# Patient Record
Sex: FEMALE | Race: WHITE | NOT HISPANIC OR LATINO | Employment: FULL TIME | URBAN - METROPOLITAN AREA
[De-identification: names, ages, dates, MRNs, and addresses within clinical notes are randomized per-mention and may not be internally consistent; named-entity substitution may affect disease eponyms.]

---

## 2017-08-31 ENCOUNTER — APPOINTMENT (OUTPATIENT)
Dept: MAMMOGRAPHY | Facility: CLINIC | Age: 52
End: 2017-08-31

## 2017-08-31 ENCOUNTER — OFFICE VISIT (OUTPATIENT)
Dept: OBSTETRICS AND GYNECOLOGY | Facility: CLINIC | Age: 52
End: 2017-08-31

## 2017-08-31 VITALS
SYSTOLIC BLOOD PRESSURE: 118 MMHG | WEIGHT: 249 LBS | DIASTOLIC BLOOD PRESSURE: 60 MMHG | HEIGHT: 66 IN | BODY MASS INDEX: 40.02 KG/M2

## 2017-08-31 DIAGNOSIS — Z12.31 VISIT FOR SCREENING MAMMOGRAM: ICD-10-CM

## 2017-08-31 DIAGNOSIS — Z01.419 WELL WOMAN EXAM WITH ROUTINE GYNECOLOGICAL EXAM: Primary | ICD-10-CM

## 2017-08-31 PROCEDURE — 99214 OFFICE O/P EST MOD 30 MIN: CPT | Performed by: OBSTETRICS & GYNECOLOGY

## 2017-08-31 PROCEDURE — 77067 SCR MAMMO BI INCL CAD: CPT | Performed by: OBSTETRICS & GYNECOLOGY

## 2017-08-31 RX ORDER — LOSARTAN POTASSIUM 50 MG/1
TABLET ORAL
Refills: 0 | COMMUNITY
Start: 2017-06-02 | End: 2019-12-27

## 2017-08-31 RX ORDER — METAXALONE 800 MG/1
TABLET ORAL
Refills: 1 | COMMUNITY
Start: 2017-07-15 | End: 2021-10-13

## 2017-08-31 RX ORDER — ETODOLAC 500 MG/1
TABLET, FILM COATED ORAL
Refills: 1 | COMMUNITY
Start: 2017-06-12 | End: 2019-12-27

## 2018-07-25 ENCOUNTER — OFFICE VISIT (OUTPATIENT)
Dept: ENDOCRINOLOGY | Age: 53
End: 2018-07-25

## 2018-07-25 VITALS
BODY MASS INDEX: 40.98 KG/M2 | WEIGHT: 255 LBS | DIASTOLIC BLOOD PRESSURE: 76 MMHG | SYSTOLIC BLOOD PRESSURE: 122 MMHG | RESPIRATION RATE: 16 BRPM | HEIGHT: 66 IN

## 2018-07-25 DIAGNOSIS — N95.1 MENOPAUSAL SYMPTOMS: ICD-10-CM

## 2018-07-25 DIAGNOSIS — R94.6 ABNORMAL THYROID FUNCTION TEST: Primary | ICD-10-CM

## 2018-07-25 DIAGNOSIS — E66.01 MORBID OBESITY (HCC): ICD-10-CM

## 2018-07-25 DIAGNOSIS — E55.9 VITAMIN D DEFICIENCY: ICD-10-CM

## 2018-07-25 DIAGNOSIS — K59.00 CONSTIPATION, UNSPECIFIED CONSTIPATION TYPE: ICD-10-CM

## 2018-07-25 DIAGNOSIS — R53.82 CHRONIC FATIGUE: ICD-10-CM

## 2018-07-25 DIAGNOSIS — E04.2 NONTOXIC MULTINODULAR GOITER: ICD-10-CM

## 2018-07-25 PROCEDURE — 99214 OFFICE O/P EST MOD 30 MIN: CPT | Performed by: INTERNAL MEDICINE

## 2018-07-25 RX ORDER — HYDROCODONE BITARTRATE AND ACETAMINOPHEN 7.5; 325 MG/1; MG/1
TABLET ORAL
Refills: 0 | COMMUNITY
Start: 2018-07-09 | End: 2019-12-27

## 2018-07-25 RX ORDER — GABAPENTIN 300 MG/1
300 CAPSULE ORAL NIGHTLY
COMMUNITY
Start: 2018-07-09 | End: 2019-12-27

## 2018-07-25 RX ORDER — PHENTERMINE HYDROCHLORIDE 30 MG/1
30 CAPSULE ORAL EVERY MORNING
Qty: 30 CAPSULE | Refills: 5 | Status: SHIPPED | OUTPATIENT
Start: 2018-07-25 | End: 2019-02-01

## 2018-07-25 RX ORDER — CHOLECALCIFEROL (VITAMIN D3) 1250 MCG
CAPSULE ORAL
Refills: 1 | COMMUNITY
Start: 2018-06-05 | End: 2023-03-02

## 2018-07-25 NOTE — PROGRESS NOTES
"Kori Crow is a 52 y.o. female seen as a new patient for low TSH. She states that she had a thyroid biopsy 6 years ago and results were normal. No recent thyroid US. She is having fatigue, trouble sleeping, headaches, and constipation.     History of Present Illness this is a 52-year-old female who has been referred for further evaluation and treatment of a low TSH.  She said she is suffering from sarcoidosis and because of nodules in her mind she underwent having a CAT scan of her chest which identified nodules in her thyroid.  Subsequently she was evaluated for that at the [facility] and had a thyroid biopsy with benign results.  She is a status post hysterectomy and has a history of ovarian cyst.  For the past 3 years she feels having hot flashes occasionally however since her divorce 16 years ago she has been celibate and not active sexually.  She also has focal seizures for which she is taking Topamax 200 mg twice daily but despite that she has gained weight and has a very difficult time losing it.  Her family history is positive for her grandmother having had diabetes and her mother had some thyroid issues and also ended up having parathyroidectomy.     /76   Resp 16   Ht 167.6 cm (66\")   Wt 116 kg (255 lb)   BMI 41.16 kg/m²      No Known Allergies    Current Outpatient Prescriptions:   •  Cholecalciferol (VITAMIN D3) 30001 units capsule, TK 1 C PO WEEKLY, Disp: , Rfl: 1  •  etodolac (LODINE) 500 MG tablet, TK 1 T PO BID WC, Disp: , Rfl: 1  •  gabapentin (NEURONTIN) 300 MG capsule, , Disp: , Rfl:   •  HYDROcodone-acetaminophen (NORCO) 7.5-325 MG per tablet, TK 1 T PO QD TO BID PRN P, Disp: , Rfl: 0  •  losartan (COZAAR) 50 MG tablet, , Disp: , Rfl: 0  •  metaxalone (SKELAXIN) 800 MG tablet, TK 1 T PO TID PRN, Disp: , Rfl: 1  •  topiramate (TOPAMAX) 200 MG tablet, TK 1 T PO  BID, Disp: , Rfl: 1      The following portions of the patient's history were reviewed and " updated as appropriate: allergies, current medications, past family history, past medical history, past social history, past surgical history and problem list.    Review of Systems   Constitutional: Positive for fatigue.   Eyes: Negative.    Respiratory: Negative.    Cardiovascular: Negative.    Gastrointestinal: Positive for constipation.   Endocrine: Negative.    Genitourinary: Negative.    Musculoskeletal: Negative.    Skin: Negative.    Allergic/Immunologic: Negative.    Neurological: Positive for headaches.   Hematological: Negative.    Psychiatric/Behavioral: Positive for sleep disturbance.       Objective   Physical Exam   Constitutional: She is oriented to person, place, and time. She appears well-developed and well-nourished. No distress.   Morbidly obese   HENT:   Head: Normocephalic and atraumatic.   Right Ear: External ear normal.   Left Ear: External ear normal.   Nose: Nose normal.   Mouth/Throat: Oropharynx is clear and moist. No oropharyngeal exudate.   Eyes: Pupils are equal, round, and reactive to light. Conjunctivae and EOM are normal. Right eye exhibits no discharge. Left eye exhibits no discharge. No scleral icterus.   Neck: Normal range of motion. Neck supple. No JVD present. No tracheal deviation present. Thyromegaly present.   Significant enlargement of both lobes of her thyroid with 2 big nodules 1 on each lobe of her thyroid.  The entire goiter moves freely with swallowing and its nontender.  I was unable to feel any lymphadenopathies in the anterior or posterior cervical or supraclavicular area.   Cardiovascular: Normal rate, regular rhythm, normal heart sounds and intact distal pulses.  Exam reveals no gallop and no friction rub.    No murmur heard.  Pulmonary/Chest: Effort normal and breath sounds normal. No stridor. No respiratory distress. She has no wheezes. She has no rales. She exhibits no tenderness.   Abdominal: Soft. Bowel sounds are normal. She exhibits no distension and no  mass. There is no tenderness. There is no rebound and no guarding. No hernia.   Musculoskeletal: Normal range of motion. She exhibits no edema, tenderness or deformity.   Lymphadenopathy:     She has no cervical adenopathy.   Neurological: She is alert and oriented to person, place, and time. She has normal reflexes. She displays normal reflexes. No cranial nerve deficit or sensory deficit. She exhibits normal muscle tone. Coordination normal.   Skin: Skin is warm and dry. No rash noted. She is not diaphoretic. No erythema. No pallor.   Psychiatric: She has a normal mood and affect. Her behavior is normal. Judgment and thought content normal.   Nursing note and vitals reviewed.        Assessment/Plan   Diagnoses and all orders for this visit:    Abnormal thyroid function test  -     T3, Free  -     T4 & TSH ([facility])  -     T4, Free  -     Thyroglobulin With Anti-TG  -     Uric Acid  -     Vitamin D 25 Hydroxy  -     Comprehensive Metabolic Panel  -     C-Peptide  -     Follicle Stimulating Hormone  -     Hemoglobin A1c  -     Lipid Panel  -     Luteinizing Hormone  -     MicroAlbumin, Urine, Random - Urine, Clean Catch  -     Prolactin  -     Cortisol  -     ACTH  -     PTH, Intact  -     Phosphorus  -     Calcium, Ionized  -     T3, Free; Future  -     T4 & TSH ([facility]); Future  -     T4, Free; Future  -     Comprehensive Thyroglobulin; Future  -     Comprehensive Metabolic Panel; Future  -     C-Peptide; Future  -     Hemoglobin A1c; Future  -     Lipid Panel; Future  -     Vitamin D 25 Hydroxy; Future  -     Uric Acid; Future    Morbid obesity (CMS/HCC)  -     T3, Free  -     T4 & TSH ([facility])  -     T4, Free  -     Thyroglobulin With Anti-TG  -     Uric Acid  -     Vitamin D 25 Hydroxy  -     Comprehensive Metabolic Panel  -     C-Peptide  -     Follicle Stimulating Hormone  -     Hemoglobin A1c  -     Lipid Panel  -     Luteinizing Hormone  -     MicroAlbumin, Urine, Random - Urine, Clean Catch  -      Prolactin  -     Cortisol  -     ACTH  -     PTH, Intact  -     Phosphorus  -     Calcium, Ionized  -     T3, Free; Future  -     T4 & TSH ([facility]); Future  -     T4, Free; Future  -     Comprehensive Thyroglobulin; Future  -     Comprehensive Metabolic Panel; Future  -     C-Peptide; Future  -     Hemoglobin A1c; Future  -     Lipid Panel; Future  -     Vitamin D 25 Hydroxy; Future  -     Uric Acid; Future    Chronic fatigue  -     T3, Free  -     T4 & TSH ([facility])  -     T4, Free  -     Thyroglobulin With Anti-TG  -     Uric Acid  -     Vitamin D 25 Hydroxy  -     Comprehensive Metabolic Panel  -     C-Peptide  -     Follicle Stimulating Hormone  -     Hemoglobin A1c  -     Lipid Panel  -     Luteinizing Hormone  -     MicroAlbumin, Urine, Random - Urine, Clean Catch  -     Prolactin  -     Cortisol  -     ACTH  -     PTH, Intact  -     Phosphorus  -     Calcium, Ionized  -     T3, Free; Future  -     T4 & TSH ([facility]); Future  -     T4, Free; Future  -     Comprehensive Thyroglobulin; Future  -     Comprehensive Metabolic Panel; Future  -     C-Peptide; Future  -     Hemoglobin A1c; Future  -     Lipid Panel; Future  -     Vitamin D 25 Hydroxy; Future  -     Uric Acid; Future    Vitamin D deficiency  -     T3, Free  -     T4 & TSH ([facility])  -     T4, Free  -     Thyroglobulin With Anti-TG  -     Uric Acid  -     Vitamin D 25 Hydroxy  -     Comprehensive Metabolic Panel  -     C-Peptide  -     Follicle Stimulating Hormone  -     Hemoglobin A1c  -     Lipid Panel  -     Luteinizing Hormone  -     MicroAlbumin, Urine, Random - Urine, Clean Catch  -     Prolactin  -     Cortisol  -     ACTH  -     PTH, Intact  -     Phosphorus  -     Calcium, Ionized  -     T3, Free; Future  -     T4 & TSH ([facility]); Future  -     T4, Free; Future  -     Comprehensive Thyroglobulin; Future  -     Comprehensive Metabolic Panel; Future  -     C-Peptide; Future  -     Hemoglobin A1c; Future  -     Lipid Panel; Future  -     Vitamin  D 25 Hydroxy; Future  -     Uric Acid; Future    Menopausal symptoms  -     T3, Free  -     T4 & TSH ([facility])  -     T4, Free  -     Thyroglobulin With Anti-TG  -     Uric Acid  -     Vitamin D 25 Hydroxy  -     Comprehensive Metabolic Panel  -     C-Peptide  -     Follicle Stimulating Hormone  -     Hemoglobin A1c  -     Lipid Panel  -     Luteinizing Hormone  -     MicroAlbumin, Urine, Random - Urine, Clean Catch  -     Prolactin  -     Cortisol  -     ACTH  -     PTH, Intact  -     Phosphorus  -     Calcium, Ionized  -     T3, Free; Future  -     T4 & TSH ([facility]); Future  -     T4, Free; Future  -     Comprehensive Thyroglobulin; Future  -     Comprehensive Metabolic Panel; Future  -     C-Peptide; Future  -     Hemoglobin A1c; Future  -     Lipid Panel; Future  -     Vitamin D 25 Hydroxy; Future  -     Uric Acid; Future    Constipation, unspecified constipation type  -     T3, Free  -     T4 & TSH ([facility])  -     T4, Free  -     Thyroglobulin With Anti-TG  -     Uric Acid  -     Vitamin D 25 Hydroxy  -     Comprehensive Metabolic Panel  -     C-Peptide  -     Follicle Stimulating Hormone  -     Hemoglobin A1c  -     Lipid Panel  -     Luteinizing Hormone  -     MicroAlbumin, Urine, Random - Urine, Clean Catch  -     Prolactin  -     Cortisol  -     ACTH  -     PTH, Intact  -     Phosphorus  -     Calcium, Ionized  -     T3, Free; Future  -     T4 & TSH ([facility]); Future  -     T4, Free; Future  -     Comprehensive Thyroglobulin; Future  -     Comprehensive Metabolic Panel; Future  -     C-Peptide; Future  -     Hemoglobin A1c; Future  -     Lipid Panel; Future  -     Vitamin D 25 Hydroxy; Future  -     Uric Acid; Future    Nontoxic multinodular goiter  -     US Thyroid; Future  -     T3, Free; Future  -     T4 & TSH ([facility]); Future  -     T4, Free; Future  -     Comprehensive Thyroglobulin; Future  -     Comprehensive Metabolic Panel; Future  -     C-Peptide; Future  -     Hemoglobin A1c; Future  -      Lipid Panel; Future  -     Vitamin D 25 Hydroxy; Future  -     Uric Acid; Future    Other orders  -     SCANNED - LABS  -     phentermine 30 MG capsule; Take 1 capsule by mouth Every Morning.               In summary I saw and examined this 52-year-old female for above-mentioned problems.  I reviewed her laboratory evaluations as well as office notes from her primary care provider and at this point we will go ahead and order an extensive laboratory evaluation as well as an ultrasound of her thyroid and once the results come back we will go ahead and call for any possible modification or new medications.  Also she is already on Topamax and combination of Topamax and phentermine have caused weight loss and therefore am going to go ahead and start her on phentermine 30 mg every morning.  She will see Ms. Yulia Brewer in 6 months or sooner if needed with laboratory evaluation prior to each office visit.

## 2018-07-26 LAB
25(OH)D3+25(OH)D2 SERPL-MCNC: 44.2 NG/ML (ref 30–100)
ACTH PLAS-MCNC: 11.7 PG/ML (ref 7.2–63.3)
ALBUMIN SERPL-MCNC: 4.8 G/DL (ref 3.5–5.2)
ALBUMIN/GLOB SERPL: 2.5 G/DL
ALP SERPL-CCNC: 65 U/L (ref 39–117)
ALT SERPL-CCNC: 15 U/L (ref 1–33)
AST SERPL-CCNC: 12 U/L (ref 1–32)
BILIRUB SERPL-MCNC: 0.4 MG/DL (ref 0.1–1.2)
BUN SERPL-MCNC: 20 MG/DL (ref 6–20)
BUN/CREAT SERPL: 25.3 (ref 7–25)
C PEPTIDE SERPL-MCNC: 4.5 NG/ML (ref 1.1–4.4)
CA-I SERPL ISE-MCNC: 5.6 MG/DL (ref 4.5–5.6)
CALCIUM SERPL-MCNC: 10 MG/DL (ref 8.6–10.5)
CHLORIDE SERPL-SCNC: 106 MMOL/L (ref 98–107)
CHOLEST SERPL-MCNC: 218 MG/DL (ref 0–200)
CO2 SERPL-SCNC: 22.7 MMOL/L (ref 22–29)
CORTIS SERPL-MCNC: 2.9 UG/DL
CREAT SERPL-MCNC: 0.79 MG/DL (ref 0.57–1)
FSH SERPL-ACNC: 53.1 MIU/ML
GLOBULIN SER CALC-MCNC: 1.9 GM/DL
GLUCOSE SERPL-MCNC: 102 MG/DL (ref 65–99)
HBA1C MFR BLD: 5.55 % (ref 4.8–5.6)
HDLC SERPL-MCNC: 61 MG/DL (ref 40–60)
INTERPRETATION: NORMAL
LDLC SERPL CALC-MCNC: 125 MG/DL (ref 0–100)
LH SERPL-ACNC: 37.2 MIU/ML
Lab: NORMAL
MICROALBUMIN UR-MCNC: 14.9 UG/ML
PHOSPHATE SERPL-MCNC: 3.5 MG/DL (ref 2.5–4.5)
POTASSIUM SERPL-SCNC: 4.1 MMOL/L (ref 3.5–5.2)
PROLACTIN SERPL-MCNC: 17.9 NG/ML (ref 4.8–23.3)
PROT SERPL-MCNC: 6.7 G/DL (ref 6–8.5)
PTH-INTACT SERPL-MCNC: 40 PG/ML (ref 15–65)
SODIUM SERPL-SCNC: 144 MMOL/L (ref 136–145)
T3FREE SERPL-MCNC: 2.6 PG/ML (ref 2–4.4)
T4 FREE SERPL-MCNC: 1.15 NG/DL (ref 0.93–1.7)
T4 SERPL-MCNC: 6.41 MCG/DL (ref 4.5–11.7)
THYROGLOB AB SERPL-ACNC: <1 IU/ML (ref 0–0.9)
THYROGLOB SERPL-MCNC: 26.7 NG/ML (ref 1.5–38.5)
TRIGL SERPL-MCNC: 161 MG/DL (ref 0–150)
TSH SERPL DL<=0.005 MIU/L-ACNC: 0.36 MIU/ML (ref 0.27–4.2)
URATE SERPL-MCNC: 4.5 MG/DL (ref 2.4–5.7)
VLDLC SERPL CALC-MCNC: 32.2 MG/DL (ref 5–40)

## 2018-08-01 ENCOUNTER — HOSPITAL ENCOUNTER (OUTPATIENT)
Dept: ULTRASOUND IMAGING | Facility: HOSPITAL | Age: 53
Discharge: HOME | End: 2018-08-01
Attending: INTERNAL MEDICINE | Admitting: INTERNAL MEDICINE

## 2018-08-01 DIAGNOSIS — E04.2 NONTOXIC MULTINODULAR GOITER: ICD-10-CM

## 2018-08-01 PROCEDURE — 76536 US EXAM OF HEAD AND NECK: CPT

## 2018-10-01 DIAGNOSIS — Z12.31 VISIT FOR SCREENING MAMMOGRAM: Primary | ICD-10-CM

## 2018-11-02 ENCOUNTER — HOSPITAL ENCOUNTER (OUTPATIENT)
Dept: MAMMOGRAPHY | Facility: HOSPITAL | Age: 53
Discharge: HOME | End: 2018-11-02
Attending: OBSTETRICS & GYNECOLOGY | Admitting: OBSTETRICS & GYNECOLOGY

## 2018-11-02 DIAGNOSIS — Z12.31 VISIT FOR SCREENING MAMMOGRAM: ICD-10-CM

## 2018-11-02 PROCEDURE — 77067 SCR MAMMO BI INCL CAD: CPT

## 2019-01-10 DIAGNOSIS — E04.2 NONTOXIC MULTINODULAR GOITER: ICD-10-CM

## 2019-01-10 DIAGNOSIS — E55.9 VITAMIN D DEFICIENCY: Primary | ICD-10-CM

## 2019-01-18 ENCOUNTER — LAB (OUTPATIENT)
Dept: ENDOCRINOLOGY | Age: 54
End: 2019-01-18

## 2019-01-18 DIAGNOSIS — E55.9 VITAMIN D DEFICIENCY: ICD-10-CM

## 2019-01-18 DIAGNOSIS — E04.2 NONTOXIC MULTINODULAR GOITER: ICD-10-CM

## 2019-01-20 LAB
25(OH)D3+25(OH)D2 SERPL-MCNC: 41.7 NG/ML (ref 30–100)
ALBUMIN SERPL-MCNC: 4.6 G/DL (ref 3.5–5.2)
ALBUMIN/GLOB SERPL: 2 G/DL
ALP SERPL-CCNC: 63 U/L (ref 39–117)
ALT SERPL-CCNC: 13 U/L (ref 1–33)
AST SERPL-CCNC: 13 U/L (ref 1–32)
BILIRUB SERPL-MCNC: 0.5 MG/DL (ref 0.1–1.2)
BUN SERPL-MCNC: 20 MG/DL (ref 6–20)
BUN/CREAT SERPL: 27 (ref 7–25)
CALCIUM SERPL-MCNC: 10.4 MG/DL (ref 8.6–10.5)
CHLORIDE SERPL-SCNC: 108 MMOL/L (ref 98–107)
CHOLEST SERPL-MCNC: 220 MG/DL (ref 0–200)
CO2 SERPL-SCNC: 23.5 MMOL/L (ref 22–29)
CREAT SERPL-MCNC: 0.74 MG/DL (ref 0.57–1)
FT4I SERPL CALC-MCNC: 2 (ref 1.2–4.9)
GLOBULIN SER CALC-MCNC: 2.3 GM/DL
GLUCOSE SERPL-MCNC: 98 MG/DL (ref 65–99)
HDLC SERPL-MCNC: 53 MG/DL (ref 40–60)
INTERPRETATION: NORMAL
LDLC SERPL CALC-MCNC: 131 MG/DL (ref 0–100)
POTASSIUM SERPL-SCNC: 4.4 MMOL/L (ref 3.5–5.2)
PROT SERPL-MCNC: 6.9 G/DL (ref 6–8.5)
SODIUM SERPL-SCNC: 144 MMOL/L (ref 136–145)
T3FREE SERPL-MCNC: 3.2 PG/ML (ref 2–4.4)
T3RU NFR SERPL: 25 % (ref 24–39)
T4 FREE SERPL-MCNC: 1.32 NG/DL (ref 0.93–1.7)
T4 SERPL-MCNC: 7.9 UG/DL (ref 4.5–12)
THYROGLOB AB SERPL-ACNC: <1 IU/ML
THYROGLOB SERPL-MCNC: 36.2 NG/ML
THYROGLOB SERPL-MCNC: NORMAL NG/ML
TRIGL SERPL-MCNC: 179 MG/DL (ref 0–150)
TSH SERPL DL<=0.005 MIU/L-ACNC: 0.61 UIU/ML (ref 0.45–4.5)
VLDLC SERPL CALC-MCNC: 35.8 MG/DL (ref 5–40)

## 2019-01-21 ENCOUNTER — RESULTS ENCOUNTER (OUTPATIENT)
Dept: ENDOCRINOLOGY | Age: 54
End: 2019-01-21

## 2019-01-21 DIAGNOSIS — K59.00 CONSTIPATION, UNSPECIFIED CONSTIPATION TYPE: ICD-10-CM

## 2019-01-21 DIAGNOSIS — E55.9 VITAMIN D DEFICIENCY: ICD-10-CM

## 2019-01-21 DIAGNOSIS — R53.82 CHRONIC FATIGUE: ICD-10-CM

## 2019-01-21 DIAGNOSIS — E66.01 MORBID OBESITY (HCC): ICD-10-CM

## 2019-01-21 DIAGNOSIS — N95.1 MENOPAUSAL SYMPTOMS: ICD-10-CM

## 2019-01-21 DIAGNOSIS — E04.2 NONTOXIC MULTINODULAR GOITER: ICD-10-CM

## 2019-01-21 DIAGNOSIS — R94.6 ABNORMAL THYROID FUNCTION TEST: ICD-10-CM

## 2019-02-01 ENCOUNTER — OFFICE VISIT (OUTPATIENT)
Dept: ENDOCRINOLOGY | Age: 54
End: 2019-02-01

## 2019-02-01 VITALS
HEIGHT: 66 IN | WEIGHT: 225 LBS | BODY MASS INDEX: 36.16 KG/M2 | SYSTOLIC BLOOD PRESSURE: 124 MMHG | DIASTOLIC BLOOD PRESSURE: 78 MMHG

## 2019-02-01 DIAGNOSIS — R53.82 CHRONIC FATIGUE: ICD-10-CM

## 2019-02-01 DIAGNOSIS — E04.2 NONTOXIC MULTINODULAR GOITER: Primary | ICD-10-CM

## 2019-02-01 DIAGNOSIS — N95.1 MENOPAUSAL SYMPTOMS: ICD-10-CM

## 2019-02-01 DIAGNOSIS — E78.2 MIXED HYPERLIPIDEMIA: ICD-10-CM

## 2019-02-01 DIAGNOSIS — E55.9 VITAMIN D DEFICIENCY DISEASE: ICD-10-CM

## 2019-02-01 DIAGNOSIS — R94.6 ABNORMAL THYROID FUNCTION TEST: ICD-10-CM

## 2019-02-01 DIAGNOSIS — E55.9 VITAMIN D DEFICIENCY: ICD-10-CM

## 2019-02-01 DIAGNOSIS — E66.01 MORBID OBESITY (HCC): ICD-10-CM

## 2019-02-01 PROCEDURE — 99214 OFFICE O/P EST MOD 30 MIN: CPT | Performed by: NURSE PRACTITIONER

## 2019-02-01 RX ORDER — ROSUVASTATIN CALCIUM 10 MG/1
10 TABLET, COATED ORAL NIGHTLY
Qty: 30 TABLET | Refills: 5 | Status: SHIPPED | OUTPATIENT
Start: 2019-02-01 | End: 2019-08-09 | Stop reason: SDUPTHER

## 2019-02-01 RX ORDER — PHENTERMINE HYDROCHLORIDE 37.5 MG/1
37.5 CAPSULE ORAL EVERY MORNING
Qty: 30 CAPSULE | Refills: 2 | Status: SHIPPED | OUTPATIENT
Start: 2019-02-01 | End: 2019-06-17 | Stop reason: SDUPTHER

## 2019-02-01 NOTE — PROGRESS NOTES
"Subjective   Arely Crow is a 53 y.o. female is here today for follow-up.  Chief Complaint   Patient presents with   • Goiter     recent labs   • abnormal thyroid function   • Vitamin D Deficiency     /78   Ht 167.6 cm (66\")   Wt 102 kg (225 lb)   BMI 36.32 kg/m²   Current Outpatient Medications on File Prior to Visit   Medication Sig   • Cholecalciferol (VITAMIN D3) 41941 units capsule TK 1 C PO WEEKLY   • etodolac (LODINE) 500 MG tablet TK 1 T PO BID WC   • gabapentin (NEURONTIN) 300 MG capsule Take 300 mg by mouth Every Night.   • HYDROcodone-acetaminophen (NORCO) 7.5-325 MG per tablet TK 1 T PO QD TO BID PRN P   • losartan (COZAAR) 50 MG tablet    • phentermine 30 MG capsule Take 1 capsule by mouth Every Morning.   • topiramate (TOPAMAX) 200 MG tablet TK 1 T PO  BID   • metaxalone (SKELAXIN) 800 MG tablet TK 1 T PO TID PRN     No current facility-administered medications on file prior to visit.      Family History   Problem Relation Age of Onset   • Ovarian cancer Maternal Grandmother      Social History     Tobacco Use   • Smoking status: Never Smoker   Substance Use Topics   • Alcohol use: Not on file   • Drug use: Not on file     No Known Allergies      History of Present Illness  Encounter Diagnoses   Name Primary?   • Morbid obesity (CMS/HCC) Yes   • Vitamin D deficiency    • Nontoxic multinodular goiter    • Abnormal thyroid function test    • Chronic fatigue    • Menopausal symptoms    53-year-old female patient here today for routine follow-up visit.  She is being seen for the above-mentioned problems.  She had recent labs which were reviewed and she was provided a copy.  She does not know of any family history of cardiovascular disease or stroke.  She states her mother does not have any history but she is not familiar with her father's history.  She has lost 30 pounds since starting phentermine at her last visit with Dr. Celeste.  She is also on topiramate which she states this helped with " weight loss.  We discussed the benefits of statin therapy.  She has plateaued with phentermine so we discussed increasing her dose.  BRO Nichols was reviewed at today's visit.  Her blood pressure is an satisfactory range    The following portions of the patient's history were reviewed and updated as appropriate: allergies, current medications, past family history, past medical history, past social history, past surgical history and problem list.    Review of Systems   Constitutional: Negative for fatigue.   HENT: Negative for trouble swallowing.    Eyes: Negative for visual disturbance.   Cardiovascular: Negative for leg swelling.   Endocrine: Negative for polyuria.   Skin: Negative for wound.   Neurological: Negative for numbness.       Objective   Physical Exam   Constitutional: She is oriented to person, place, and time. She appears well-developed and well-nourished. No distress.   HENT:   Head: Normocephalic and atraumatic.   Right Ear: External ear normal.   Left Ear: External ear normal.   Nose: Nose normal.   Mouth/Throat: Oropharynx is clear and moist. No oropharyngeal exudate.   Eyes: EOM are normal. Pupils are equal, round, and reactive to light. Right eye exhibits no discharge. Left eye exhibits no discharge.   Neck: Trachea normal, normal range of motion and full passive range of motion without pain. Neck supple. No tracheal tenderness present. Carotid bruit is not present. No tracheal deviation, no edema and no erythema present. Thyromegaly present. No thyroid mass present.   Cardiovascular: Normal rate, regular rhythm, normal heart sounds and intact distal pulses. Exam reveals no gallop and no friction rub.   No murmur heard.  Pulmonary/Chest: Effort normal and breath sounds normal. No stridor. No respiratory distress. She has no wheezes. She has no rales.   Abdominal: Soft. Bowel sounds are normal. She exhibits no distension.   Musculoskeletal: Normal range of motion. She exhibits no edema or deformity.    Lymphadenopathy:     She has no cervical adenopathy.   Neurological: She is alert and oriented to person, place, and time.   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 No rash noted. She is not diaphoretic. No erythema. No pallor.   Psychiatric: She has a normal mood and affect. Her behavior is normal. Judgment and thought content normal.   Nursing note and vitals reviewed.    Results for orders placed or performed in visit on 01/18/19   Thyroid Panel With TSH   Result Value Ref Range    TSH 0.609 0.450 - 4.500 uIU/mL    T4, Total 7.9 4.5 - 12.0 ug/dL    T3 Uptake 25 24 - 39 %    Free Thyroxine Index 2.0 1.2 - 4.9   Comprehensive Thyroglobulin   Result Value Ref Range    Thyroglobulin Ab <1.0 IU/mL    Thyroglobulin 36.2 ng/mL    Thyroglobulin (TG-RUSLAN) Comment ng/mL   T4, Free   Result Value Ref Range    Free T4 1.32 0.93 - 1.70 ng/dL   T3, Free   Result Value Ref Range    T3, Free 3.2 2.0 - 4.4 pg/mL   Vitamin D 25 Hydroxy   Result Value Ref Range    25 Hydroxy, Vitamin D 41.7 30.0 - 100.0 ng/ml   Lipid Panel   Result Value Ref Range    Total Cholesterol 220 (H) 0 - 200 mg/dL    Triglycerides 179 (H) 0 - 150 mg/dL    HDL Cholesterol 53 40 - 60 mg/dL    VLDL Cholesterol 35.8 5 - 40 mg/dL    LDL Cholesterol  131 (H) 0 - 100 mg/dL   Comprehensive Metabolic Panel   Result Value Ref Range    Glucose 98 65 - 99 mg/dL    BUN 20 6 - 20 mg/dL    Creatinine 0.74 0.57 - 1.00 mg/dL    eGFR Non African Am 82 >60 mL/min/1.73    eGFR African Am 99 >60 mL/min/1.73    BUN/Creatinine Ratio 27.0 (H) 7.0 - 25.0    Sodium 144 136 - 145 mmol/L    Potassium 4.4 3.5 - 5.2 mmol/L    Chloride 108 (H) 98 - 107 mmol/L    Total CO2 23.5 22.0 - 29.0 mmol/L    Calcium 10.4 8.6 - 10.5 mg/dL    Total Protein 6.9 6.0 - 8.5 g/dL    Albumin 4.60 3.50 - 5.20 g/dL    Globulin 2.3 gm/dL    A/G Ratio 2.0 g/dL    Total Bilirubin 0.5 0.1 - 1.2 mg/dL    Alkaline Phosphatase 63 39 - 117 U/L    AST (SGOT) 13 1 - 32 U/L    ALT (SGPT) 13 1 - 33  U/L   Cardiovascular Risk Assessment   Result Value Ref Range    Interpretation Note          Assessment/Plan   Problems Addressed this Visit        Digestive    Vitamin D deficiency    Morbid obesity (CMS/HCC) - Primary       Endocrine    Nontoxic multinodular goiter       Genitourinary    Menopausal symptoms       Other    Chronic fatigue    Abnormal thyroid function test          In summary, patient was seen and examined.  Metabolically she is stable.  She has normal thyroid hormones.  She is currently not on any thyroid medication.  Her last thyroid ultrasound was in July of last year and shows that she does have a multinodular goiter.  She has had a history of a biopsy that was benign.  She denies any problems with dysphasia were tenderness with palpation.  She has familial tremors in her right hand which she states is not new.  She has been start Crestor 10 mg once daily for treatment of LDL cholesterol.  Her phentermine has been increased to 37.5 mg daily.  BRO Nichols was reviewed.  She is to follow-up in 6 months with Dr. Celeste or myself with labs prior.  She's been advised she can contact the office should she have any concerns prior to then.  She has been provided information on diet to lower cholesterol.

## 2019-04-09 ENCOUNTER — TELEPHONE (OUTPATIENT)
Dept: SPORTS MEDICINE | Facility: CLINIC | Age: 54
End: 2019-04-09

## 2019-06-17 RX ORDER — PHENTERMINE HYDROCHLORIDE 37.5 MG/1
37.5 CAPSULE ORAL EVERY MORNING
Qty: 30 CAPSULE | Refills: 0 | Status: CANCELLED | OUTPATIENT
Start: 2019-06-17

## 2019-06-17 RX ORDER — PHENTERMINE HYDROCHLORIDE 37.5 MG/1
37.5 CAPSULE ORAL EVERY MORNING
Qty: 30 CAPSULE | Refills: 0 | Status: SHIPPED | OUTPATIENT
Start: 2019-06-17 | End: 2019-08-09 | Stop reason: SDUPTHER

## 2019-07-19 DIAGNOSIS — E78.2 MIXED HYPERLIPIDEMIA: ICD-10-CM

## 2019-07-19 DIAGNOSIS — R94.6 ABNORMAL THYROID FUNCTION TEST: ICD-10-CM

## 2019-07-19 DIAGNOSIS — E55.9 VITAMIN D DEFICIENCY: Primary | ICD-10-CM

## 2019-07-19 DIAGNOSIS — E04.2 NONTOXIC MULTINODULAR GOITER: ICD-10-CM

## 2019-07-19 PROBLEM — M54.50 LOW BACK PAIN: Status: ACTIVE | Noted: 2019-07-19

## 2019-07-26 ENCOUNTER — LAB (OUTPATIENT)
Dept: ENDOCRINOLOGY | Age: 54
End: 2019-07-26

## 2019-07-26 DIAGNOSIS — R94.6 ABNORMAL THYROID FUNCTION TEST: ICD-10-CM

## 2019-07-26 DIAGNOSIS — E78.2 MIXED HYPERLIPIDEMIA: ICD-10-CM

## 2019-07-26 DIAGNOSIS — E55.9 VITAMIN D DEFICIENCY: ICD-10-CM

## 2019-07-26 DIAGNOSIS — E04.2 NONTOXIC MULTINODULAR GOITER: ICD-10-CM

## 2019-07-29 LAB
25(OH)D3+25(OH)D2 SERPL-MCNC: 50.8 NG/ML (ref 30–100)
ALBUMIN SERPL-MCNC: 4.4 G/DL (ref 3.5–5.2)
ALBUMIN/GLOB SERPL: 1.8 G/DL
ALP SERPL-CCNC: 69 U/L (ref 39–117)
ALT SERPL-CCNC: 13 U/L (ref 1–33)
AST SERPL-CCNC: 13 U/L (ref 1–32)
BILIRUB SERPL-MCNC: 0.3 MG/DL (ref 0.2–1.2)
BUN SERPL-MCNC: 21 MG/DL (ref 6–20)
BUN/CREAT SERPL: 31.3 (ref 7–25)
CALCIUM SERPL-MCNC: 10.1 MG/DL (ref 8.6–10.5)
CHLORIDE SERPL-SCNC: 106 MMOL/L (ref 98–107)
CHOLEST SERPL-MCNC: 133 MG/DL (ref 0–200)
CO2 SERPL-SCNC: 24.9 MMOL/L (ref 22–29)
CREAT SERPL-MCNC: 0.67 MG/DL (ref 0.57–1)
FT4I SERPL CALC-MCNC: 1.9 (ref 1.2–4.9)
GLOBULIN SER CALC-MCNC: 2.4 GM/DL
GLUCOSE SERPL-MCNC: 100 MG/DL (ref 65–99)
HDLC SERPL-MCNC: 65 MG/DL (ref 40–60)
INTERPRETATION: NORMAL
LDLC SERPL CALC-MCNC: 50 MG/DL (ref 0–100)
POTASSIUM SERPL-SCNC: 4.1 MMOL/L (ref 3.5–5.2)
PROT SERPL-MCNC: 6.8 G/DL (ref 6–8.5)
SODIUM SERPL-SCNC: 145 MMOL/L (ref 136–145)
T3FREE SERPL-MCNC: 3.1 PG/ML (ref 2–4.4)
T3RU NFR SERPL: 25 % (ref 24–39)
T4 FREE SERPL-MCNC: 1.19 NG/DL (ref 0.93–1.7)
T4 SERPL-MCNC: 7.4 UG/DL (ref 4.5–12)
THYROGLOB AB SERPL-ACNC: <1 IU/ML
THYROGLOB SERPL-MCNC: 38.2 NG/ML
THYROGLOB SERPL-MCNC: NORMAL NG/ML
TRIGL SERPL-MCNC: 92 MG/DL (ref 0–150)
TSH SERPL DL<=0.005 MIU/L-ACNC: 0.83 UIU/ML (ref 0.45–4.5)
VLDLC SERPL CALC-MCNC: 18.4 MG/DL

## 2019-08-09 ENCOUNTER — OFFICE VISIT (OUTPATIENT)
Dept: ENDOCRINOLOGY | Age: 54
End: 2019-08-09

## 2019-08-09 VITALS
SYSTOLIC BLOOD PRESSURE: 142 MMHG | DIASTOLIC BLOOD PRESSURE: 92 MMHG | WEIGHT: 207.2 LBS | BODY MASS INDEX: 33.3 KG/M2 | RESPIRATION RATE: 16 BRPM | HEIGHT: 66 IN

## 2019-08-09 DIAGNOSIS — E78.2 MIXED HYPERLIPIDEMIA: ICD-10-CM

## 2019-08-09 DIAGNOSIS — E55.9 VITAMIN D DEFICIENCY DISEASE: ICD-10-CM

## 2019-08-09 DIAGNOSIS — E04.2 NONTOXIC MULTINODULAR GOITER: Primary | ICD-10-CM

## 2019-08-09 DIAGNOSIS — E66.01 MORBID OBESITY (HCC): ICD-10-CM

## 2019-08-09 DIAGNOSIS — R94.6 ABNORMAL THYROID FUNCTION TEST: ICD-10-CM

## 2019-08-09 PROCEDURE — 99214 OFFICE O/P EST MOD 30 MIN: CPT | Performed by: NURSE PRACTITIONER

## 2019-08-09 RX ORDER — PHENTERMINE HYDROCHLORIDE 37.5 MG/1
37.5 CAPSULE ORAL EVERY MORNING
Qty: 30 CAPSULE | Refills: 5 | Status: SHIPPED | OUTPATIENT
Start: 2019-08-09 | End: 2019-12-27

## 2019-08-09 RX ORDER — ROSUVASTATIN CALCIUM 10 MG/1
10 TABLET, COATED ORAL NIGHTLY
Qty: 30 TABLET | Refills: 5 | Status: SHIPPED | OUTPATIENT
Start: 2019-08-09 | End: 2019-12-27

## 2019-08-09 NOTE — PROGRESS NOTES
"Subjective   Arely Crow is a 53 y.o. female is here today for follow-up.  Chief Complaint   Patient presents with   • Hypertension     lab review; denies any problems or concerns      /92   Resp 16   Ht 167.6 cm (66\")   Wt 94 kg (207 lb 3.2 oz)   BMI 33.44 kg/m²   Current Outpatient Medications on File Prior to Visit   Medication Sig   • Cholecalciferol (VITAMIN D3) 62653 units capsule TK 1 C PO WEEKLY   • etodolac (LODINE) 500 MG tablet TK 1 T PO BID WC   • gabapentin (NEURONTIN) 300 MG capsule Take 300 mg by mouth Every Night.   • HYDROcodone-acetaminophen (NORCO) 7.5-325 MG per tablet TK 1 T PO QD TO BID PRN P   • losartan (COZAAR) 50 MG tablet    • metaxalone (SKELAXIN) 800 MG tablet TK 1 T PO TID PRN   • topiramate (TOPAMAX) 200 MG tablet TK 1 T PO  BID   • [DISCONTINUED] phentermine 37.5 MG capsule Take 1 capsule by mouth Every Morning.   • [DISCONTINUED] rosuvastatin (CRESTOR) 10 MG tablet Take 1 tablet by mouth Every Night.     No current facility-administered medications on file prior to visit.      Family History   Problem Relation Age of Onset   • Ovarian cancer Maternal Grandmother      Social History     Tobacco Use   • Smoking status: Never Smoker   Substance Use Topics   • Alcohol use: Not on file   • Drug use: Not on file     No Known Allergies      History of Present Illness  Encounter Diagnoses   Name Primary?   • Abnormal thyroid function test    • Nontoxic multinodular goiter Yes   • Vitamin D deficiency disease    • Mixed hyperlipidemia    • Morbid obesity (CMS/HCC)      53-year-old female patient here today for routine follow-up visit.  She is being seen for the above-mentioned problems.  She has lost approximately 50 pounds with diet and phentermine.  She started cholesterol medicine at her last visit and has had significant improvement in her cholesterol.  She has no complaints or concerns at today's visit.  She denies any signs and symptoms of hyper or hypothyroidism.  The " following portions of the patient's history were reviewed and updated as appropriate: allergies, current medications, past family history, past medical history, past social history, past surgical history and problem list.    Review of Systems   Constitutional: Negative for fatigue.   Cardiovascular: Negative for chest pain.   Endocrine: Negative for cold intolerance and heat intolerance.   Psychiatric/Behavioral: Negative for sleep disturbance.     I have reviewed the patient's ROS dated 08/09/2019   and attest to its accuracy.    Objective   Physical Exam   Constitutional: She is oriented to person, place, and time. She appears well-developed and well-nourished. No distress.   HENT:   Head: Normocephalic and atraumatic.   Right Ear: External ear normal.   Left Ear: External ear normal.   Nose: Nose normal.   Mouth/Throat: Oropharynx is clear and moist. No oropharyngeal exudate.   Eyes: EOM are normal. Pupils are equal, round, and reactive to light. Right eye exhibits no discharge. Left eye exhibits no discharge.   Neck: Trachea normal, normal range of motion and full passive range of motion without pain. Neck supple. No tracheal tenderness present. Carotid bruit is not present. No tracheal deviation, no edema and no erythema present. Thyromegaly present. No thyroid mass present.   Cardiovascular: Normal rate, regular rhythm, normal heart sounds and intact distal pulses. Exam reveals no gallop and no friction rub.   No murmur heard.  Pulmonary/Chest: Effort normal and breath sounds normal. No stridor. No respiratory distress. She has no wheezes. She has no rales.   Abdominal: Soft. Bowel sounds are normal. She exhibits no distension.   Musculoskeletal: Normal range of motion. She exhibits no edema or deformity.   Lymphadenopathy:     She has no cervical adenopathy.   Neurological: She is alert and oriented to person, place, and time.   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 No rash noted. She is  not diaphoretic. No erythema. No pallor.   Psychiatric: She has a normal mood and affect. Her behavior is normal. Judgment and thought content normal.   Nursing note and vitals reviewed.      Results for orders placed or performed in visit on 07/26/19   Comprehensive Thyroglobulin   Result Value Ref Range    Thyroglobulin Ab <1.0 IU/mL    Thyroglobulin 38.2 ng/mL    Thyroglobulin (TG-RUSLAN) Comment ng/mL   Thyroid Panel With TSH   Result Value Ref Range    TSH 0.830 0.450 - 4.500 uIU/mL    T4, Total 7.4 4.5 - 12.0 ug/dL    T3 Uptake 25 24 - 39 %    Free Thyroxine Index 1.9 1.2 - 4.9   T4, Free   Result Value Ref Range    Free T4 1.19 0.93 - 1.70 ng/dL   T3, Free   Result Value Ref Range    T3, Free 3.1 2.0 - 4.4 pg/mL   Vitamin D 25 Hydroxy   Result Value Ref Range    25 Hydroxy, Vitamin D 50.8 30.0 - 100.0 ng/ml   Lipid Panel   Result Value Ref Range    Total Cholesterol 133 0 - 200 mg/dL    Triglycerides 92 0 - 150 mg/dL    HDL Cholesterol 65 (H) 40 - 60 mg/dL    VLDL Cholesterol 18.4 mg/dL    LDL Cholesterol  50 0 - 100 mg/dL   Comprehensive Metabolic Panel   Result Value Ref Range    Glucose 100 (H) 65 - 99 mg/dL    BUN 21 (H) 6 - 20 mg/dL    Creatinine 0.67 0.57 - 1.00 mg/dL    eGFR Non African Am 92 >60 mL/min/1.73    eGFR African Am 111 >60 mL/min/1.73    BUN/Creatinine Ratio 31.3 (H) 7.0 - 25.0    Sodium 145 136 - 145 mmol/L    Potassium 4.1 3.5 - 5.2 mmol/L    Chloride 106 98 - 107 mmol/L    Total CO2 24.9 22.0 - 29.0 mmol/L    Calcium 10.1 8.6 - 10.5 mg/dL    Total Protein 6.8 6.0 - 8.5 g/dL    Albumin 4.40 3.50 - 5.20 g/dL    Globulin 2.4 gm/dL    A/G Ratio 1.8 g/dL    Total Bilirubin 0.3 0.2 - 1.2 mg/dL    Alkaline Phosphatase 69 39 - 117 U/L    AST (SGOT) 13 1 - 32 U/L    ALT (SGPT) 13 1 - 33 U/L   Cardiovascular Risk Assessment   Result Value Ref Range    Interpretation Note        Assessment/Plan   Problems Addressed this Visit        Digestive    Morbid obesity (CMS/HCC)    Vitamin D deficiency disease        Endocrine    Nontoxic multinodular goiter - Primary       Other    Abnormal thyroid function test      Other Visit Diagnoses     Mixed hyperlipidemia        Relevant Medications    rosuvastatin (CRESTOR) 10 MG tablet      In summary, patient was seen and examined.  Clinically and metabolically she is stable.  A ambar has been requested and reviewed.  She will follow-up with Dr. Celeste or myself in 6 months.  Her thyroid hormones are normal.  She  has a history of multinodular goiter.  She had a thyroid ultrasound done in 2018 which was reviewed with patient at today's visit and she was also provided a copy.  She denies any tenderness with palpation.  She has no difficulty swallowing.  Her energy level is good.  She continues on phentermine for weight loss.  She has been successful losing about 50 pounds.  She has been successful with changing her diet.  Vitamin D is stable.  Her labs were reviewed and she was provided a copy.  Cholesterol has improved significantly on statin therapy.

## 2019-12-27 ENCOUNTER — PROCEDURE VISIT (OUTPATIENT)
Dept: OBSTETRICS AND GYNECOLOGY | Facility: CLINIC | Age: 54
End: 2019-12-27

## 2019-12-27 ENCOUNTER — APPOINTMENT (OUTPATIENT)
Dept: WOMENS IMAGING | Facility: HOSPITAL | Age: 54
End: 2019-12-27

## 2019-12-27 ENCOUNTER — OFFICE VISIT (OUTPATIENT)
Dept: OBSTETRICS AND GYNECOLOGY | Facility: CLINIC | Age: 54
End: 2019-12-27

## 2019-12-27 VITALS
HEIGHT: 66 IN | BODY MASS INDEX: 34.07 KG/M2 | SYSTOLIC BLOOD PRESSURE: 122 MMHG | WEIGHT: 212 LBS | DIASTOLIC BLOOD PRESSURE: 78 MMHG

## 2019-12-27 DIAGNOSIS — Z12.31 VISIT FOR SCREENING MAMMOGRAM: Primary | ICD-10-CM

## 2019-12-27 DIAGNOSIS — Z01.419 WOMEN'S ANNUAL ROUTINE GYNECOLOGICAL EXAMINATION: Primary | ICD-10-CM

## 2019-12-27 DIAGNOSIS — Z12.11 COLON CANCER SCREENING: ICD-10-CM

## 2019-12-27 LAB
DEVELOPER EXPIRATION DATE: NORMAL
DEVELOPER LOT NUMBER: NORMAL
EXPIRATION DATE: NORMAL
FECAL OCCULT BLOOD SCREEN, POC: NEGATIVE
Lab: NORMAL
NEGATIVE CONTROL: NEGATIVE
POSITIVE CONTROL: POSITIVE

## 2019-12-27 PROCEDURE — 99396 PREV VISIT EST AGE 40-64: CPT | Performed by: OBSTETRICS & GYNECOLOGY

## 2019-12-27 PROCEDURE — 77063 BREAST TOMOSYNTHESIS BI: CPT | Performed by: OBSTETRICS & GYNECOLOGY

## 2019-12-27 PROCEDURE — G0328 FECAL BLOOD SCRN IMMUNOASSAY: HCPCS | Performed by: OBSTETRICS & GYNECOLOGY

## 2019-12-27 PROCEDURE — 77067 SCR MAMMO BI INCL CAD: CPT | Performed by: OBSTETRICS & GYNECOLOGY

## 2019-12-27 PROCEDURE — 77063 BREAST TOMOSYNTHESIS BI: CPT | Performed by: RADIOLOGY

## 2019-12-27 PROCEDURE — 77067 SCR MAMMO BI INCL CAD: CPT | Performed by: RADIOLOGY

## 2019-12-27 RX ORDER — GABAPENTIN 300 MG/1
CAPSULE ORAL
COMMUNITY
Start: 2018-07-09 | End: 2023-03-02

## 2019-12-27 RX ORDER — LOSARTAN POTASSIUM 50 MG/1
TABLET ORAL
COMMUNITY
End: 2023-03-02

## 2019-12-27 RX ORDER — ETODOLAC 500 MG/1
TABLET, EXTENDED RELEASE ORAL
COMMUNITY
Start: 2019-12-11 | End: 2021-10-13

## 2019-12-27 RX ORDER — ROSUVASTATIN CALCIUM 10 MG/1
TABLET, COATED ORAL
COMMUNITY
End: 2020-02-07

## 2019-12-27 RX ORDER — TOPIRAMATE 100 MG/1
200 TABLET, FILM COATED ORAL 2 TIMES DAILY
COMMUNITY
Start: 2019-12-10 | End: 2021-10-13

## 2019-12-27 RX ORDER — TOPIRAMATE 50 MG/1
TABLET, FILM COATED ORAL
COMMUNITY
Start: 2019-12-10 | End: 2021-01-29

## 2019-12-27 NOTE — PROGRESS NOTES
"[facility]  [address], [address]  [place], [place] [address]  Phone: [phone] / Fax:  [phone]      2019    Jass ENWE[address] [facility] [address]    Geno Flowers APRN    Chief Complaint   Patient presents with   • Gynecologic Exam     AE       Arely Crow is here for annual gynecologic exam.  HPI - Patient had hysterectomy for benign indications 15 years ago.  She did mammogram today.  Patient had colonoscopy at 50 years of age and reports that it was normal.  She had a history of an incidental ovarian cyst, approximately 5 to 7 years, and was told it did not require surgical intervention or follow up.  She has no pelvic pain or symptoms.    Past Medical History:   Diagnosis Date   • Arthritis    • Focal seizure (CMS/HCC)    • Hypertension        Past Surgical History:   Procedure Laterality Date   • COLONOSCOPY     • HYSTERECTOMY     • KNEE ARTHROSCOPY     • TONSILLECTOMY         No Known Allergies    Social History     Socioeconomic History   • Marital status: Single     Spouse name: Not on file   • Number of children: Not on file   • Years of education: Not on file   • Highest education level: Not on file   Tobacco Use   • Smoking status: Never Smoker   Substance and Sexual Activity   • Alcohol use: Yes     Comment: rare       Family History   Problem Relation Age of Onset   • Ovarian cancer Maternal Grandmother        No LMP recorded. Patient is postmenopausal.    OB History        2    Para   2    Term   2            AB        Living           SAB        TAB        Ectopic        Molar        Multiple        Live Births                    Vitals:    19 1415   BP: 122/78   Weight: 96.2 kg (212 lb)   Height: 167.6 cm (66\")       Physical Exam   Constitutional: She appears well-developed and well-nourished.   Genitourinary: Rectum normal and vagina normal. Pelvic exam was performed with patient supine. There is no tenderness or lesion on the right labia. " There is no tenderness or lesion on the left labia. Right adnexum palpable. Left adnexum palpable. Rectal exam shows no mass and guaiac negative stool.   Genitourinary Comments: Uterus and cervix absent   HENT:   Right Ear: External ear normal.   Left Ear: External ear normal.   Nose: Nose normal.   Eyes: Conjunctivae are normal.   Neck: Normal range of motion. Neck supple. No thyromegaly present.   Cardiovascular: Normal rate, regular rhythm and normal heart sounds.   Pulmonary/Chest: Effort normal. No stridor. She has no wheezes. Right breast exhibits no mass and no nipple discharge. Left breast exhibits no mass and no nipple discharge.   Abdominal: Soft. There is no guarding.   Musculoskeletal: Normal range of motion. She exhibits no edema.   Neurological: She is alert. Coordination normal.   Skin: Skin is warm and dry.   Psychiatric: She has a normal mood and affect. Her behavior is normal. Judgment and thought content normal.   Vitals reviewed.      Arely was seen today for gynecologic exam.    Diagnoses and all orders for this visit:    Women's annual routine gynecological examination        -     Discussed importance of routine screening and breast awareness.        -     Patient will attempt to locate old records and will evaluate whether any further follow up needs to be pursued, regarding ovarian cyst.  Likely was premenopausal based on her recollection of work up.  Colon cancer screening  -     POC Occult Blood Stool.  Colonoscopy up to date.        Charles Dick MD

## 2020-02-07 RX ORDER — ROSUVASTATIN CALCIUM 10 MG/1
TABLET, COATED ORAL
Qty: 30 TABLET | Refills: 0 | Status: SHIPPED | OUTPATIENT
Start: 2020-02-07 | End: 2020-02-28

## 2020-02-28 RX ORDER — ROSUVASTATIN CALCIUM 10 MG/1
TABLET, COATED ORAL
Qty: 30 TABLET | Refills: 0 | Status: SHIPPED | OUTPATIENT
Start: 2020-02-28 | End: 2023-03-13 | Stop reason: HOSPADM

## 2020-04-01 RX ORDER — ROSUVASTATIN CALCIUM 10 MG/1
TABLET, COATED ORAL
Qty: 30 TABLET | Refills: 0 | OUTPATIENT
Start: 2020-04-01

## 2021-01-29 ENCOUNTER — PROCEDURE VISIT (OUTPATIENT)
Dept: OBSTETRICS AND GYNECOLOGY | Facility: CLINIC | Age: 56
End: 2021-01-29

## 2021-01-29 ENCOUNTER — APPOINTMENT (OUTPATIENT)
Dept: WOMENS IMAGING | Facility: HOSPITAL | Age: 56
End: 2021-01-29

## 2021-01-29 ENCOUNTER — OFFICE VISIT (OUTPATIENT)
Dept: OBSTETRICS AND GYNECOLOGY | Facility: CLINIC | Age: 56
End: 2021-01-29

## 2021-01-29 VITALS
SYSTOLIC BLOOD PRESSURE: 128 MMHG | WEIGHT: 240 LBS | HEIGHT: 66 IN | BODY MASS INDEX: 38.57 KG/M2 | DIASTOLIC BLOOD PRESSURE: 62 MMHG

## 2021-01-29 DIAGNOSIS — Z12.11 COLON CANCER SCREENING: ICD-10-CM

## 2021-01-29 DIAGNOSIS — Z01.419 VISIT FOR GYNECOLOGIC EXAMINATION: Primary | ICD-10-CM

## 2021-01-29 DIAGNOSIS — Z12.31 VISIT FOR SCREENING MAMMOGRAM: Primary | ICD-10-CM

## 2021-01-29 PROCEDURE — 77063 BREAST TOMOSYNTHESIS BI: CPT | Performed by: OBSTETRICS & GYNECOLOGY

## 2021-01-29 PROCEDURE — 99396 PREV VISIT EST AGE 40-64: CPT | Performed by: OBSTETRICS & GYNECOLOGY

## 2021-01-29 PROCEDURE — 77067 SCR MAMMO BI INCL CAD: CPT | Performed by: RADIOLOGY

## 2021-01-29 PROCEDURE — 77067 SCR MAMMO BI INCL CAD: CPT | Performed by: OBSTETRICS & GYNECOLOGY

## 2021-01-29 PROCEDURE — 77063 BREAST TOMOSYNTHESIS BI: CPT | Performed by: RADIOLOGY

## 2021-01-29 PROCEDURE — 82274 ASSAY TEST FOR BLOOD FECAL: CPT | Performed by: OBSTETRICS & GYNECOLOGY

## 2021-03-24 ENCOUNTER — BULK ORDERING (OUTPATIENT)
Dept: CASE MANAGEMENT | Facility: OTHER | Age: 56
End: 2021-03-24

## 2021-03-24 DIAGNOSIS — Z23 IMMUNIZATION DUE: ICD-10-CM

## 2021-09-14 ENCOUNTER — OFFICE VISIT (OUTPATIENT)
Dept: ENDOCRINOLOGY | Age: 56
End: 2021-09-14

## 2021-09-14 VITALS
BODY MASS INDEX: 39.89 KG/M2 | WEIGHT: 248.2 LBS | OXYGEN SATURATION: 98 % | HEART RATE: 83 BPM | HEIGHT: 66 IN | SYSTOLIC BLOOD PRESSURE: 122 MMHG | DIASTOLIC BLOOD PRESSURE: 74 MMHG

## 2021-09-14 DIAGNOSIS — E04.2 NONTOXIC MULTINODULAR GOITER: Primary | ICD-10-CM

## 2021-09-14 PROCEDURE — 99214 OFFICE O/P EST MOD 30 MIN: CPT | Performed by: NURSE PRACTITIONER

## 2021-09-14 RX ORDER — METRONIDAZOLE 10 MG/G
GEL TOPICAL
COMMUNITY
End: 2023-03-02

## 2021-09-14 RX ORDER — ACETAMINOPHEN 500 MG
1000 TABLET ORAL 2 TIMES DAILY
COMMUNITY
End: 2023-03-02

## 2021-09-14 RX ORDER — CALCIUM CARBONATE 300MG(750)
1 TABLET,CHEWABLE ORAL DAILY
COMMUNITY
End: 2023-03-02

## 2021-09-14 RX ORDER — HYDROCODONE BITARTRATE AND ACETAMINOPHEN 7.5; 325 MG/1; MG/1
1 TABLET ORAL 2 TIMES DAILY PRN
COMMUNITY
Start: 2021-09-13 | End: 2023-03-13 | Stop reason: HOSPADM

## 2021-09-14 NOTE — PROGRESS NOTES
"Chief Complaint  Goiter    Subjective          Arely Crow presents to [facility] ENDOCRINOLOGY  History of Present Illness     2018: CONCLUSION: Multinodular goiter.  The largest of  these is ovoid in shape 2.2 cm in maximum length, occupying the lower  pole. At the junction of the right thyroid and the isthmus there is a 10  mm mixed echotexture solid nodule.     Within the left lobe of the thyroid there is a sizable dominant mixed  cystic and solid (largely cystic) mass which measures 3.9 x 2.5 x 2.7  cm. It is situated within the midpole. There are 3 other much smaller  mixed cystic and solid nodules within the left gland.    7/2021: via care everywhere   IMPRESSION:   Bilateral thyroid nodules.   Heterogenous thyroid parenchyma suggesting chronic thyroiditis.     Multiple thyroid nodules are identified. A 2.4 cm hypoechoic nodule in the superior   right thyroid has internal vascularity. There is a mixed echogenic nodule in the   inferior right thyroid measuring 3.4 cm. There is a predominantly cystic nodule in the   mid left thyroid measurng 3.9 cmi. No microcalcifications are identified.       Thinks they biopsied the right nodule 7-8 years ago.  She does feel like her nodules feel bigger    Denies dysphagia and voice changes  Reports TSH 0.2 7/2021, 0.3 8/2020  Reports low energy from working a lot       Objective   Vital Signs:   /74 (BP Location: Right arm, Patient Position: Sitting, Cuff Size: Large Adult)   Pulse 83   Ht 167.6 cm (65.98\")   Wt 113 kg (248 lb 3.2 oz)   SpO2 98%   BMI 40.08 kg/m²     Physical Exam  Vitals reviewed.   Constitutional:       General: She is not in acute distress.  HENT:      Head: Normocephalic and atraumatic.   Neck:      Comments: nodules  Cardiovascular:      Rate and Rhythm: Normal rate and regular rhythm.   Pulmonary:      Effort: Pulmonary effort is normal. No respiratory distress.   Musculoskeletal:         General: No signs of injury. " Normal range of motion.      Cervical back: Normal range of motion and neck supple.   Skin:     General: Skin is warm and dry.   Neurological:      Mental Status: She is alert and oriented to person, place, and time. Mental status is at baseline.   Psychiatric:         Mood and Affect: Mood normal.         Behavior: Behavior normal.         Thought Content: Thought content normal.         Judgment: Judgment normal.          Result Review :   The following data was reviewed by: RICH Rawls on 09/14/2021:                Assessment and Plan    Diagnoses and all orders for this visit:    1. Nontoxic multinodular goiter (Primary)  -     TSH  -     T4, Free  -     T3, Free  -     Thyroid Stimulating Immunoglobulin  -     Thyroid Peroxidase Antibody  -     Thyroglobulin        Follow Up   Return in about 6 months (around 3/14/2022).     Will follow up with Dr. Hamilton: Biopsy versus ENT evaluation for possible thyroidectomy  Nodules are significantly bigger  Her goiter is visible and palpable  She does not report any dysphagia or voice changes  Repeat thyroid function test today    Patient was given instructions and counseling regarding her condition or for health maintenance advice. Please see specific information pulled into the AVS if appropriate.     RICH Rawls

## 2021-09-14 NOTE — PROGRESS NOTES
Her nodules are significantly big in size where a biopsy could miss the pathology of being malignant.  She would be a good candidate to be seen by ENT.  They might discuss with the patient options of biopsy versus total thyroidectomy.  Based on the ultrasound the features of the nodules definitely look reassuring with no microcalcifications etc. but there is definitely a change in the size of the nodules for which at least a close monitoring is necessary but based on the size she might benefit from total thyroidectomy versus repeat biopsy.    Her meeting with the ENT would help her make this decision.    Please place the order for ENT consult.

## 2021-09-15 NOTE — PROGRESS NOTES
Please notify the patient that we do recommend to proceed with ENT evaluation, I will place the order.  The ear nose and throat specialist will discuss options between biopsy versus possibly removing the thyroid

## 2021-09-16 DIAGNOSIS — E04.2 NONTOXIC MULTINODULAR GOITER: Primary | ICD-10-CM

## 2021-09-23 LAB
T3FREE SERPL-MCNC: 3.4 PG/ML (ref 2–4.4)
T4 FREE SERPL-MCNC: 1.2 NG/DL (ref 0.93–1.7)
THYROGLOB SERPL-MCNC: 117 NG/ML
THYROPEROXIDASE AB SERPL-ACNC: <8 IU/ML (ref 0–34)
TSH SERPL DL<=0.005 MIU/L-ACNC: 0.5 UIU/ML (ref 0.27–4.2)
TSI SER-ACNC: <0.1 IU/L (ref 0–0.55)

## 2021-09-23 NOTE — PROGRESS NOTES
Thyroid labs overall stable. The TG is elevated and most likely from thyroid inflammation. Please also send labs to her ENT for her evaluation with them

## 2021-10-13 ENCOUNTER — OFFICE VISIT (OUTPATIENT)
Dept: OBSTETRICS AND GYNECOLOGY | Facility: CLINIC | Age: 56
End: 2021-10-13

## 2021-10-13 VITALS
HEIGHT: 66 IN | BODY MASS INDEX: 38.89 KG/M2 | DIASTOLIC BLOOD PRESSURE: 77 MMHG | WEIGHT: 242 LBS | SYSTOLIC BLOOD PRESSURE: 124 MMHG

## 2021-10-13 DIAGNOSIS — N93.9 VAGINAL BLEEDING: Primary | ICD-10-CM

## 2021-10-13 PROCEDURE — 99213 OFFICE O/P EST LOW 20 MIN: CPT | Performed by: OBSTETRICS & GYNECOLOGY

## 2021-10-13 RX ORDER — ETODOLAC 500 MG/1
TABLET, FILM COATED ORAL
COMMUNITY
Start: 2021-10-02 | End: 2023-03-02

## 2021-10-13 RX ORDER — CYCLOBENZAPRINE HCL 5 MG
TABLET ORAL
COMMUNITY
Start: 2021-07-16 | End: 2023-03-02

## 2021-10-14 NOTE — PROGRESS NOTES
Subjective   Arely Crow is a 56 y.o. female.     Cc:  Bleeding    History of Present Illness - Patient is a 56 year old hysterectomized female who presents with 3 day history of vaginal bleeding.  Bleeding occurred one week ago.  It was not associated with intercourse or exertion.  Patient does not have impression that bleeding came from urine or bowel movement.  She is not on any hormonal therapies.  She had hysterectomy for benign indication 20 years ago (prolapse) but reports history of abnormal pap testing with cervical conization.    The following portions of the patient's history were reviewed and updated as appropriate:   She  has a past medical history of Arthritis, Focal seizure (HCC), and Hypertension.  She  has a past surgical history that includes Hysterectomy; Colonoscopy (2015); Knee arthroscopy; and Tonsillectomy.  She  reports that she has never smoked. She has never used smokeless tobacco. She reports current alcohol use. She reports that she does not use drugs.  Current Outpatient Medications   Medication Sig Dispense Refill   • acetaminophen (TYLENOL) 500 MG tablet Take 1 tablet by mouth 2 (Two) Times a Day.     • Cholecalciferol (VITAMIN D3) 36110 units capsule TK 1 C PO WEEKLY  1   • cyclobenzaprine (FLEXERIL) 5 MG tablet      • Diclofenac Sodium (VOLTAREN) 1 % gel gel      • etodolac (LODINE) 500 MG tablet      • gabapentin (NEURONTIN) 300 MG capsule 600 qhs     • HYDROcodone-acetaminophen (NORCO) 7.5-325 MG per tablet 1 tablet 2 (Two) Times a Day.     • losartan (COZAAR) 50 MG tablet losartan 50 mg tablet     • Magnesium 400 MG tablet Take 1 tablet by mouth Daily.     • metroNIDAZOLE (METROGEL) 1 % gel metronidazole 1 % topical gel     • Multiple Vitamins-Minerals (HAIR SKIN NAILS PO) Take  by mouth.     • rosuvastatin (CRESTOR) 10 MG tablet TAKE 1 TABLET BY MOUTH EVERY NIGHT 30 tablet 0   • topiramate (TOPAMAX) 200 MG tablet      • TURMERIC PO Take  by mouth.       No current  facility-administered medications for this visit.     She has No Known Allergies..    Review of Systems   Genitourinary: Positive for vaginal bleeding. Negative for vaginal discharge.       Objective   Physical Exam  Vitals reviewed. Exam conducted with a chaperone present.   Constitutional:       Appearance: Normal appearance.   Genitourinary:     General: Normal vulva.      Exam position: Lithotomy position.      Labia:         Right: No rash or tenderness.         Left: No rash or tenderness.       Urethra: No prolapse.      Vagina: Normal.      Rectum: External hemorrhoid present.   Neurological:      Mental Status: She is alert.   Psychiatric:         Mood and Affect: Mood normal.         Behavior: Behavior normal.         Thought Content: Thought content normal.         Judgment: Judgment normal.         Assessment/Plan   Diagnoses and all orders for this visit:    1. Vaginal bleeding (Primary)  -     NuSwab Vaginitis (VG) - Swab, Vagina  -     IgP, Aptima HPV  -     No lesions noted but patient does have external hemorrhoids.  If testing is negative but bleeding persists, patient will require evaluation of perineal lesions/hemorrhoids.    Charles Dick MD

## 2021-10-18 ENCOUNTER — TELEPHONE (OUTPATIENT)
Dept: OBSTETRICS AND GYNECOLOGY | Facility: CLINIC | Age: 56
End: 2021-10-18

## 2021-10-18 LAB
A VAGINAE DNA VAG QL NAA+PROBE: ABNORMAL SCORE
BVAB2 DNA VAG QL NAA+PROBE: ABNORMAL SCORE
C ALBICANS DNA VAG QL NAA+PROBE: NEGATIVE
C GLABRATA DNA VAG QL NAA+PROBE: POSITIVE
CYTOLOGIST CVX/VAG CYTO: NORMAL
CYTOLOGY CVX/VAG DOC CYTO: NORMAL
CYTOLOGY CVX/VAG DOC THIN PREP: NORMAL
DX ICD CODE: NORMAL
HIV 1 & 2 AB SER-IMP: NORMAL
HPV I/H RISK 4 DNA CVX QL PROBE+SIG AMP: NEGATIVE
MEGA1 DNA VAG QL NAA+PROBE: ABNORMAL SCORE
OTHER STN SPEC: NORMAL
STAT OF ADQ CVX/VAG CYTO-IMP: NORMAL
T VAGINALIS DNA VAG QL NAA+PROBE: NEGATIVE

## 2021-10-18 RX ORDER — FLUCONAZOLE 150 MG/1
150 TABLET ORAL ONCE
Qty: 1 TABLET | Refills: 0 | Status: SHIPPED | OUTPATIENT
Start: 2021-10-18 | End: 2021-10-18

## 2021-10-18 NOTE — TELEPHONE ENCOUNTER
Nighat    Let her know that she has a yeast infection.    I called in Diflucan.    Thanks    Mayelin

## 2021-10-21 ENCOUNTER — TRANSCRIBE ORDERS (OUTPATIENT)
Dept: ADMINISTRATIVE | Facility: HOSPITAL | Age: 56
End: 2021-10-21

## 2021-10-21 DIAGNOSIS — E04.2 MULTINODULAR THYROID: Primary | ICD-10-CM

## 2021-11-09 ENCOUNTER — APPOINTMENT (OUTPATIENT)
Dept: NUCLEAR MEDICINE | Facility: HOSPITAL | Age: 56
End: 2021-11-09

## 2021-11-10 ENCOUNTER — APPOINTMENT (OUTPATIENT)
Dept: NUCLEAR MEDICINE | Facility: HOSPITAL | Age: 56
End: 2021-11-10

## 2021-11-11 ENCOUNTER — HOSPITAL ENCOUNTER (OUTPATIENT)
Dept: NUCLEAR MEDICINE | Facility: HOSPITAL | Age: 56
Discharge: HOME | End: 2021-11-11

## 2021-11-11 DIAGNOSIS — E04.2 MULTINODULAR THYROID: ICD-10-CM

## 2021-11-11 PROCEDURE — A9516 IODINE I-123 SOD IODIDE MIC: HCPCS | Performed by: OTOLARYNGOLOGY

## 2021-11-11 PROCEDURE — 0 SODIUM IODIDE 3.7 MBQ CAPSULE: Performed by: OTOLARYNGOLOGY

## 2021-11-11 PROCEDURE — 78014 THYROID IMAGING W/BLOOD FLOW: CPT

## 2021-11-11 RX ORDER — SODIUM IODIDE I 123 100 UCI/1
1 CAPSULE, GELATIN COATED ORAL
Status: COMPLETED | OUTPATIENT
Start: 2021-11-11 | End: 2021-11-11

## 2021-11-11 RX ADMIN — Medication 1 CAPSULE: at 14:18

## 2021-11-12 ENCOUNTER — HOSPITAL ENCOUNTER (OUTPATIENT)
Dept: NUCLEAR MEDICINE | Facility: HOSPITAL | Age: 56
Discharge: HOME | End: 2021-11-12

## 2022-12-07 ENCOUNTER — TRANSCRIBE ORDERS (OUTPATIENT)
Dept: ADMINISTRATIVE | Facility: HOSPITAL | Age: 57
End: 2022-12-07

## 2022-12-07 DIAGNOSIS — Z12.31 VISIT FOR SCREENING MAMMOGRAM: Primary | ICD-10-CM

## 2022-12-13 ENCOUNTER — OFFICE VISIT (OUTPATIENT)
Dept: ORTHOPEDIC SURGERY | Facility: CLINIC | Age: 57
End: 2022-12-13

## 2022-12-13 VITALS — BODY MASS INDEX: 33.43 KG/M2 | RESPIRATION RATE: 16 BRPM | TEMPERATURE: 96.4 F | WEIGHT: 208 LBS | HEIGHT: 66 IN

## 2022-12-13 DIAGNOSIS — M25.561 RIGHT KNEE PAIN, UNSPECIFIED CHRONICITY: Primary | ICD-10-CM

## 2022-12-13 DIAGNOSIS — M17.11 PRIMARY OSTEOARTHRITIS OF RIGHT KNEE: ICD-10-CM

## 2022-12-13 PROCEDURE — 99214 OFFICE O/P EST MOD 30 MIN: CPT | Performed by: NURSE PRACTITIONER

## 2022-12-13 PROCEDURE — 73562 X-RAY EXAM OF KNEE 3: CPT | Performed by: NURSE PRACTITIONER

## 2022-12-13 RX ORDER — DULOXETIN HYDROCHLORIDE 30 MG/1
15 CAPSULE, DELAYED RELEASE ORAL
COMMUNITY
Start: 2022-12-08 | End: 2023-03-02

## 2022-12-13 RX ORDER — METHIMAZOLE 5 MG/1
5 TABLET ORAL DAILY
COMMUNITY
Start: 2022-12-06 | End: 2023-03-02

## 2022-12-13 RX ORDER — ETODOLAC 500 MG/1
500 TABLET, FILM COATED ORAL 2 TIMES DAILY
COMMUNITY
Start: 2022-11-07 | End: 2023-03-13 | Stop reason: HOSPADM

## 2022-12-13 RX ORDER — CHLORHEXIDINE GLUCONATE 500 MG/1
CLOTH TOPICAL 2 TIMES DAILY
Status: CANCELLED | OUTPATIENT
Start: 2022-12-13

## 2022-12-13 RX ORDER — POVIDONE-IODINE 10 MG/ML
SOLUTION TOPICAL ONCE
Status: CANCELLED | OUTPATIENT
Start: 2023-03-13 | End: 2022-12-13

## 2022-12-13 RX ORDER — LOSARTAN POTASSIUM 50 MG/1
50 TABLET ORAL DAILY
COMMUNITY
Start: 2022-06-17 | End: 2022-12-14

## 2022-12-13 RX ORDER — DULOXETIN HYDROCHLORIDE 30 MG/1
CAPSULE, DELAYED RELEASE ORAL
COMMUNITY
Start: 2022-12-08 | End: 2023-03-02

## 2022-12-13 RX ORDER — PREGABALIN 75 MG/1
150 CAPSULE ORAL ONCE
Status: CANCELLED | OUTPATIENT
Start: 2023-03-13 | End: 2022-12-13

## 2022-12-13 RX ORDER — METHIMAZOLE 5 MG/1
2.5 TABLET ORAL NIGHTLY
COMMUNITY

## 2022-12-13 RX ORDER — CHLORHEXIDINE GLUCONATE 4 G/100ML
SOLUTION TOPICAL
COMMUNITY
End: 2023-03-02

## 2022-12-13 RX ORDER — MELOXICAM 15 MG/1
15 TABLET ORAL ONCE
Status: CANCELLED | OUTPATIENT
Start: 2023-03-13 | End: 2022-12-13

## 2022-12-13 RX ORDER — CEFAZOLIN SODIUM 2 G/100ML
2 INJECTION, SOLUTION INTRAVENOUS ONCE
Status: CANCELLED | OUTPATIENT
Start: 2023-03-13 | End: 2022-12-13

## 2022-12-13 NOTE — PROGRESS NOTES
Patient: Arely Crow  YOB: 1965 57 y.o. female  Medical Record Number: [id number]    Chief Complaints:   Chief Complaint   Patient presents with   • Right Knee - Follow-up       History of Present Illness:Arely Crow is a 57 y.o. female who presents with complaints of having right knee pain that is chronic in nature.  Patient reports that her knee has been hurting her approximately 4 to 5 years with no known specific injury.  Patient reports the pain is globally across the anterior portion of her knee and describes it as a constant daily ache.  She states her pain is a around a 7 or 8 out of 10 on a daily basis.  She states that she also has some instability issues in which her knee catches and locks.  She reports her symptoms are worse with prolonged walking and standing and going up and down steps; mild periods of relief with anti-inflammatories and taking brief periods of rest.  Patient states that she is done conservative measures of physical therapy, NSAID therapy, and intra-articular steroid injections all which have given her minimal relief.  Patient is here today to discuss surgical treatment options.    Allergies: No Known Allergies    Medications:   Current Outpatient Medications   Medication Sig Dispense Refill   • acetaminophen (TYLENOL) 500 MG tablet Take 1 tablet by mouth 2 (Two) Times a Day.     • Cholecalciferol (VITAMIN D3) 30503 units capsule TK 1 C PO WEEKLY  1   • DULoxetine (CYMBALTA) 30 MG capsule duloxetine 30 mg capsule,delayed release   TAKE ONE CAPSULE BY MOUTH TWICE DAILY     • etodolac (LODINE) 500 MG tablet      • HYDROcodone-acetaminophen (NORCO) 7.5-325 MG per tablet 1 tablet 2 (Two) Times a Day.     • losartan (COZAAR) 50 MG tablet Take 50 mg by mouth Daily.     • methIMAzole (TAPAZOLE) 5 MG tablet Take 5 mg by mouth Daily.     • topiramate (TOPAMAX) 200 MG tablet      • chlorhexidine (HIBICLENS) 4 % external liquid Antiseptic Skin Cleanser (chlorhexidine) 4 %  "liquid   WASH BACK AND GLUTEAL REGION WITH HIBICLENS SOAP NIGHT BEFORE AND MORNING OF PROCEDURE     • cyclobenzaprine (FLEXERIL) 5 MG tablet      • Diclofenac Sodium (VOLTAREN) 1 % gel gel      • DULoxetine (CYMBALTA) 30 MG capsule      • etodolac (LODINE) 500 MG tablet Take 500 mg by mouth.     • gabapentin (NEURONTIN) 300 MG capsule 600 qhs     • losartan (COZAAR) 50 MG tablet losartan 50 mg tablet     • Magnesium 400 MG tablet Take 1 tablet by mouth Daily.     • methIMAzole (TAPAZOLE) 5 MG tablet methimazole 5 mg tablet     • metroNIDAZOLE (METROGEL) 1 % gel metronidazole 1 % topical gel     • Multiple Vitamins-Minerals (HAIR SKIN NAILS PO) Take  by mouth.     • rosuvastatin (CRESTOR) 10 MG tablet TAKE 1 TABLET BY MOUTH EVERY NIGHT 30 tablet 0   • topiramate (TOPAMAX) 200 MG tablet Take 1 tablet by mouth 2 (Two) Times a Day.     • TURMERIC PO Take  by mouth.       No current facility-administered medications for this visit.         The following portions of the patient's history were reviewed and updated as appropriate: allergies, current medications, past family history, past medical history, past social history, past surgical history and problem list.    Review of Systems:   A 14 point review of systems was performed. All systems negative except pertinent positives/negative listed in HPI above    Physical Exam:   Vitals:    12/13/22 1456   Resp: 16   Temp: 96.4 °F (35.8 °C)   TempSrc: Temporal   Weight: 94.3 kg (208 lb)   Height: 167.6 cm (65.98\")   PainSc: 0-No pain       General: A and O x 3, ASA, NAD    SCLERA:    Normal    DENTITION:   Normal    Knee:  right    ALIGNMENT:     Varus  ,   Patella  tracks  midline    GAIT:    Antalgic    SKIN:    No abnormality    RANGE OF MOTION:   5  -  105   DEG    STRENGTH:   4  / 5    LIGAMENTS:    No varus / valgus instability.   Negative  Lachman.    MENISCUS:     Negative   Sheila       DISTAL PULSES:    Paplable    DISTAL SENSATION :   Intact    LYMPHATICS:     No   " lymphadenopathy    OTHER:          - Positive   effusion      - Crepitance with ROM       - Joint Line Tenderness      - Pes Anserine Bursitis      Radiology:  Xrays 3views (ap,lateral, sunrise) were ordered and reviewed for evaluation of knee pain demonstrating advanced varus osteoarthritis with bone on bone articulation, subchondral cysts, and periarticular osteophytes as well as advanced patellofemoral osteoarthritis.  No other x-rays available for comparison purposes.    Assessment/Plan: Primary osteoarthritis right knee    Continuation of conservative management vs. TKA discussed.  The patient wishes to proceed with total knee replacement.  At this point the patient has failed the full compliment of conservative treatment and stating complete understanding of the risks/benefits/ anternatives wishes to proceed with surgical treatment.    Risk and benefits of surgery were reviewed.  Including, but not limited to, blood clots or pulmonary embolism, anesthesia risk, infection, fracture, skin/leg numbness, persistent pain/crepitance/popping/catching, failure of the implant, need for future surgeries, hematoma, possible nerve or blood vessel injury, need for transfusion, and potential risk of stroke,heart attack or death, among others.  The patient understands and wishes to proceed.     It was explained that if tissue has been repaired or reconstructed, there is also an increased chance of failure which may require further management.  Following the completion of the discussion, the patient expressed understanding of this planned course of care, all their questions were answered and consent will be obtained preoperatively.    Operative Plan: [facility] Total Knee Replacement performing the procedure on an outpatient basiswith home health rehab.  We will give the patient a prescription for physical therapy to work on up prehab exercises.  She does not require any type of medical clearance and has a  current Body mass index is 33.59 kg/m².        Benito Graham, RICH  12/13/2022     This patient was seen in conjunction today with Dr. Uriel Willoughby.  Dr. Willoughby agrees with the above-stated assessment and plan.

## 2022-12-14 ENCOUNTER — PATIENT ROUNDING (BHMG ONLY) (OUTPATIENT)
Dept: ORTHOPEDIC SURGERY | Facility: CLINIC | Age: 57
End: 2022-12-14

## 2022-12-14 NOTE — PROGRESS NOTES
A [employer] Message has been sent to the patient for PATIENT ROUNDING with [facility]

## 2022-12-19 PROBLEM — M17.11 PRIMARY OSTEOARTHRITIS OF RIGHT KNEE: Status: ACTIVE | Noted: 2022-12-19

## 2023-03-02 ENCOUNTER — PRE-ADMISSION TESTING (OUTPATIENT)
Dept: PREADMISSION TESTING | Facility: HOSPITAL | Age: 58
End: 2023-03-02
Payer: COMMERCIAL

## 2023-03-02 VITALS
BODY MASS INDEX: 33.57 KG/M2 | DIASTOLIC BLOOD PRESSURE: 85 MMHG | OXYGEN SATURATION: 98 % | TEMPERATURE: 98.5 F | HEIGHT: 66 IN | HEART RATE: 86 BPM | SYSTOLIC BLOOD PRESSURE: 137 MMHG | RESPIRATION RATE: 16 BRPM

## 2023-03-02 LAB
ANION GAP SERPL CALCULATED.3IONS-SCNC: 11.1 MMOL/L (ref 5–15)
BUN SERPL-MCNC: 15 MG/DL (ref 6–20)
BUN/CREAT SERPL: 19 (ref 7–25)
CALCIUM SPEC-SCNC: 10 MG/DL (ref 8.6–10.5)
CHLORIDE SERPL-SCNC: 109 MMOL/L (ref 98–107)
CO2 SERPL-SCNC: 22.9 MMOL/L (ref 22–29)
CREAT SERPL-MCNC: 0.79 MG/DL (ref 0.57–1)
DEPRECATED RDW RBC AUTO: 38.7 FL (ref 37–54)
EGFRCR SERPLBLD CKD-EPI 2021: 86.8 ML/MIN/1.73
ERYTHROCYTE [DISTWIDTH] IN BLOOD BY AUTOMATED COUNT: 12.2 % (ref 12.3–15.4)
GLUCOSE SERPL-MCNC: 108 MG/DL (ref 65–99)
HCT VFR BLD AUTO: 44.4 % (ref 34–46.6)
HGB BLD-MCNC: 14.5 G/DL (ref 12–15.9)
MCH RBC QN AUTO: 28.4 PG (ref 26.6–33)
MCHC RBC AUTO-ENTMCNC: 32.7 G/DL (ref 31.5–35.7)
MCV RBC AUTO: 86.9 FL (ref 79–97)
PLATELET # BLD AUTO: 310 10*3/MM3 (ref 140–450)
PMV BLD AUTO: 9.2 FL (ref 6–12)
POTASSIUM SERPL-SCNC: 4 MMOL/L (ref 3.5–5.2)
QT INTERVAL: 382 MS
RBC # BLD AUTO: 5.11 10*6/MM3 (ref 3.77–5.28)
SODIUM SERPL-SCNC: 143 MMOL/L (ref 136–145)
WBC NRBC COR # BLD: 6.28 10*3/MM3 (ref 3.4–10.8)

## 2023-03-02 PROCEDURE — 85027 COMPLETE CBC AUTOMATED: CPT

## 2023-03-02 PROCEDURE — 80048 BASIC METABOLIC PNL TOTAL CA: CPT

## 2023-03-02 PROCEDURE — 93010 ELECTROCARDIOGRAM REPORT: CPT | Performed by: INTERNAL MEDICINE

## 2023-03-02 PROCEDURE — 93005 ELECTROCARDIOGRAM TRACING: CPT

## 2023-03-02 PROCEDURE — 36415 COLL VENOUS BLD VENIPUNCTURE: CPT

## 2023-03-02 RX ORDER — DULOXETIN HYDROCHLORIDE 30 MG/1
1 CAPSULE, DELAYED RELEASE ORAL 2 TIMES DAILY
COMMUNITY

## 2023-03-02 RX ORDER — SENNOSIDES 8.6 MG
650 CAPSULE ORAL EVERY 8 HOURS PRN
COMMUNITY

## 2023-03-02 NOTE — DISCHARGE INSTRUCTIONS
Take the following medications the morning of surgery:    DULOXETINE AND TOPAMAX    ARRIVE AT 6:00 OFFICE WILL CONFIRM TIME OF ARRIVAL      If you are on prescription narcotic pain medication to control your pain you may also take that medication the morning of surgery.    General Instructions:  Do not eat solid food after midnight the night before surgery.  You may drink clear liquids day of surgery but must stop at least one hour before your hospital arrival time.  It is beneficial for you to have a clear drink that contains carbohydrates the day of surgery.  We suggest a 12 to 20 ounce bottle of Gatorade or Powerade for non-diabetic patients or a 12 to 20 ounce bottle of G2 or Powerade Zero for diabetic patients. (Pediatric patients, are not advised to drink a 12 to 20 ounce carbohydrate drink)    Clear liquids are liquids you can see through.  Nothing red in color.     Plain water                               Sports drinks  Sodas                                   Gelatin (Jell-O)  Fruit juices without pulp such as white grape juice and apple juice  Popsicles that contain no fruit or yogurt  Tea or coffee (no cream or milk added)  Gatorade / Powerade  G2 / Powerade Zero    Infants may have breast milk up to four hours before surgery.  Infants drinking formula may drink formula up to six hours before surgery.   Patients who avoid smoking, chewing tobacco and alcohol for 4 weeks prior to surgery have a reduced risk of post-operative complications.  Quit smoking as many days before surgery as you can.  Do not smoke, use chewing tobacco or drink alcohol the day of surgery.   If applicable bring your C-PAP/ BI-PAP machine.  Bring any papers given to you in the doctor’s office.  Wear clean comfortable clothes.  Do not wear contact lenses, false eyelashes or make-up.  Bring a case for your glasses.   Bring crutches or walker if applicable.  Remove all piercings.  Leave jewelry and any other valuables at home.  Hair  extensions with metal clips must be removed prior to surgery.  The Pre-Admission Testing nurse will instruct you to bring medications if unable to obtain an accurate list in Pre-Admission Testing.          Preventing a Surgical Site Infection:  For 2 to 3 days before surgery, avoid shaving with a razor because the razor can irritate skin and make it easier to develop an infection.    Any areas of open skin can increase the risk of a post-operative wound infection by allowing bacteria to enter and travel throughout the body.  Notify your surgeon if you have any skin wounds / rashes even if it is not near the expected surgical site.  The area will need assessed to determine if surgery should be delayed until it is healed.  The night prior to surgery shower using a fresh bar of anti-bacterial soap (such as Dial) and clean washcloth.  Sleep in a clean bed with clean clothing.  Do not allow pets to sleep with you.  Shower on the morning of surgery using a fresh bar of anti-bacterial soap (such as Dial) and clean washcloth.  Dry with a clean towel and dress in clean clothing.  Ask your surgeon if you will be receiving antibiotics prior to surgery.  Make sure you, your family, and all healthcare providers clean their hands with soap and water or an alcohol based hand  before caring for you or your wound.    Day of surgery:  Your arrival time is approximately two hours before your scheduled surgery time.  Upon arrival, a Pre-op nurse and Anesthesiologist will review your health history, obtain vital signs, and answer questions you may have.  The only belongings needed at this time will be a list of your home medications and if applicable your C-PAP/BI-PAP machine.  A Pre-op nurse will start an IV and you may receive medication in preparation for surgery, including something to help you relax.     Please be aware that surgery does come with discomfort.  We want to make every effort to control your discomfort so  please discuss any uncontrolled symptoms with your nurse.   Your doctor will most likely have prescribed pain medications.      If you are going home after surgery you will receive individualized written care instructions before being discharged.  A responsible adult must drive you to and from the hospital on the day of your surgery and stay with you for 24 hours.  Discharge prescriptions can be filled by the hospital pharmacy during regular pharmacy hours.  If you are having surgery late in the day/evening your prescription may be e-prescribed to your pharmacy.  Please verify your pharmacy hours or chose a 24 hour pharmacy to avoid not having access to your prescription because your pharmacy has closed for the day.    If you are staying overnight following surgery, you will be transported to your hospital room following the recovery period.  [facility] has all private rooms.    If you have any questions please call Pre-Admission Testing at [phone].  Deductibles and co-payments are collected on the day of service. Please be prepared to pay the required co-pay, deductible or deposit on the day of service as defined by your plan.    Call your surgeon immediately if you experience any of the following symptoms:  Sore Throat  Shortness of Breath or difficulty breathing  Cough  Chills  Body soreness or muscle pain  Headache  Fever  New loss of taste or smell  Do not arrive for your surgery ill.  Your procedure will need to be rescheduled to another time.  You will need to call your physician before the day of surgery to avoid any unnecessary exposure to hospital staff as well as other patients.       CHLORHEXIDINE CLOTH INSTRUCTIONS  The morning of surgery follow these instructions using the Chlorhexidine cloths you've been given.  These steps reduce bacteria on the body.  Do not use the cloths near your eyes, ears mouth, genitalia or on open wounds.  Throw the cloths away after use but do not try to  flush them down a toilet.      Open and remove one cloth at a time from the package.    Leave the cloth unfolded and begin the bathing.  Massage the skin with the cloths using gentle pressure to remove bacteria.  Do not scrub harshly.   Follow the steps below with one 2% CHG cloth per area (6 total cloths).  One cloth for neck, shoulders and chest.  One cloth for both arms, hands, fingers and underarms (do underarms last).  One cloth for the abdomen followed by groin.  One cloth for right leg and foot including between the toes.  One cloth for left leg and foot including between the toes.  The last cloth is to be used for the back of the neck, back and buttocks.    Allow the CHG to air dry 3 minutes on the skin which will give it time to work and decrease the chance of irritation.  The skin may feel sticky until it is dry.  Do not rinse with water or any other liquid or you will lose the beneficial effects of the CHG.  If mild skin irritation occurs, do rinse the skin to remove the CHG.  Report this to the nurse at time of admission.  Do not apply lotions, creams, ointments, deodorants or perfumes after using the clothes. Dress in clean clothes before coming to the hospital.

## 2023-03-07 ENCOUNTER — TELEPHONE (OUTPATIENT)
Dept: ORTHOPEDIC SURGERY | Facility: HOSPITAL | Age: 58
End: 2023-03-07
Payer: COMMERCIAL

## 2023-03-07 NOTE — TELEPHONE ENCOUNTER
Risk Factor yes no   Age >75  X   BMI <20 >40  X   Patient History     Chronic Pain (2 or more meds/Pain Management)  X   ETOH (more than 3 drinks Daily)  X   Uncontrolled Depression/Bipolar/Schizoaffective Disorder  X   Arrhythmias  X   Stent placement/MI  X   DVT/PE  X   Pacemaker  X   HTN (uncontrolled or requiring more than 2 medications)  X   CHF/Retained fluids/Edema  X   Stroke with Residual   X   COPD/Asthma  X   RODERICK--Non-compliant with CPAP  X   Diabetes (on insulin or more than 2 meds)         A1C:  X   BPH/Urinary retention (on medication)  X   CKD  X   Home environment and support     Current ambulation status (use of cane, walker, W/C, Multiple falls/weakness)  X   Stairs to enter and throughout home X    Lives Alone  X   Doesn’t have support at home  X     Outpatient Screening Assessment    Home needs: (Walker/BSC):  Needs walker  ? Steps  4 steps in with rail   Caregiver 24-48hrs post-discharge: 3 adults live in the house    Discharge Plan:   PT    Prescriptions: Meds to bed    Home medications:   ? Blood thinner/anti-coag therapy--   ? BPH or diuretic  ? BP meds--  ? Pain/Anti-inflammatories--Norco  Pre-op Education:  Educate patient on spinal anesthesia/pain control:  ? patient verbalize understanding    Educate patient on hospital course/timeline:  ?  patient verbalize understanding    Joint Care Class:  ?  yes ? no    Notes:

## 2023-03-09 ENCOUNTER — OFFICE VISIT (OUTPATIENT)
Dept: ORTHOPEDIC SURGERY | Facility: CLINIC | Age: 58
End: 2023-03-09
Payer: COMMERCIAL

## 2023-03-09 VITALS
BODY MASS INDEX: 34.55 KG/M2 | SYSTOLIC BLOOD PRESSURE: 124 MMHG | HEIGHT: 66 IN | WEIGHT: 215 LBS | TEMPERATURE: 98.6 F | DIASTOLIC BLOOD PRESSURE: 80 MMHG

## 2023-03-09 DIAGNOSIS — R52 PAIN: Primary | ICD-10-CM

## 2023-03-09 PROCEDURE — S0260 H&P FOR SURGERY: HCPCS | Performed by: NURSE PRACTITIONER

## 2023-03-09 PROCEDURE — 77077 JOINT SURVEY SINGLE VIEW: CPT | Performed by: ORTHOPAEDIC SURGERY

## 2023-03-09 NOTE — H&P (VIEW-ONLY)
Patient: Arely Crow    Date of Admission: 3/13/2023    YOB: 1965    Medical Record Number: [id number]    Admitting Physician: Dr. Uriel Willoughby    Reason for Admission: End Stage Right Knee OA    History of Present Illness: 57 y.o. female presents with severe end stage knee osteoarthritis which has not been responsive to the full compliment of conservative measures. Despite conservative attempts, there is still severe, constant activity-limiting pain. Given the severity of the pain, the patient has elected to proceed with knee replacement.    Allergies: No Known Allergies      Current Medications:  Home Medications:    Current Outpatient Medications on File Prior to Visit   Medication Sig   • acetaminophen (TYLENOL) 650 MG 8 hr tablet Take 1 tablet by mouth Every 8 (Eight) Hours As Needed for Mild Pain.   • Chlorhexidine Gluconate 2 % pads Apply  topically. As directed pre op   • DULoxetine (CYMBALTA) 30 MG capsule Take 1 capsule by mouth 2 (Two) Times a Day.   • etodolac (LODINE) 500 MG tablet Take 1 tablet by mouth 2 (Two) Times a Day. TO HOLD 1 WEEK BEFORE SURGERY   • HYDROcodone-acetaminophen (NORCO) 7.5-325 MG per tablet Take 1 tablet by mouth 2 (Two) Times a Day As Needed. HOLDING FOR SURGERY PER PT OWN REQUEST   • methIMAzole (TAPAZOLE) 5 MG tablet Take 2.5 mg by mouth Every Night.   • topiramate (TOPAMAX) 200 MG tablet Take 1 tablet by mouth 2 (Two) Times a Day.   • Diclofenac Sodium (VOLTAREN) 1 % gel gel Apply 4 g topically to the appropriate area as directed 2 (Two) Times a Day. INSTRUCTED TO ASK MD IF NEEDS TO HOLD FOR SURGERY   • rosuvastatin (CRESTOR) 10 MG tablet TAKE 1 TABLET BY MOUTH EVERY NIGHT (Patient taking differently: Take 1 tablet by mouth Every Night.)     No current facility-administered medications on file prior to visit.     PRN Meds:.    PMH:     Past Medical History:   Diagnosis Date   • Arthritis    • Female stress incontinence    • Focal seizure (HCC)    • History of  "hypertension     LOST WEIGHT OFF MEDS SINCE NOV 2022   • Right knee pain    • Thyroid nodule        PF/Surg/Soc Hx:     Past Surgical History:   Procedure Laterality Date   • COLONOSCOPY  2015   • HYSTERECTOMY     • KNEE ARTHROSCOPY Right    • THYROID BIOPSY     • TONSILLECTOMY          Social History     Occupational History   • Not on file   Tobacco Use   • Smoking status: Never   • Smokeless tobacco: Never   Vaping Use   • Vaping Use: Never used   Substance and Sexual Activity   • Alcohol use: Yes     Comment: rare   • Drug use: Never   • Sexual activity: Not Currently     Birth control/protection: Surgical      Social History     Social History Narrative   • Not on file        Family History   Problem Relation Age of Onset   • No Known Problems Mother    • No Known Problems Father    • Ovarian cancer Paternal Aunt    • Ovarian cancer Maternal Grandmother    • Dementia Maternal Grandmother    • Malig Hyperthermia Neg Hx          Review of Systems:   A 14 point review of systems was performed, pertinent positives discussed above, all other systems are negative    Physical Exam: 57 y.o. female  Vital Signs :   Vitals:    03/09/23 1405   BP: 124/80   BP Location: Right arm   Patient Position: Sitting   Cuff Size: Large Adult   Temp: 98.6 °F (37 °C)   TempSrc: Temporal   Weight: 97.5 kg (215 lb)   Height: 167.6 cm (65.98\")   PainSc:   6     General: Alert and Oriented x 3, No acute distress.  Psych: mood and affect appropriate; recent and remote memory intact  Eyes: conjunctivae clear; pupils equally round and reactive, sclerae anicteric  CV: no peripheral edema  Resp: normal respiratory effort  Skin: no rashes or wounds; normal turgor  Musculosketetal; pain and crepitance with knee range of motion  Vascular: palpable distal pulses    Xrays:  -3 views (AP, lateral, and sunrise) were reviewed demonstrating end-stage OA with bone on bone articulation.  -A full length AP xray was ordered and reviewed today for purposes " of operative alignment demonstrating end stage arthritic findings. There are no previous full length films for review    Assessment:  End-stage Right knee osteoarthritis. Conservative measures have failed.      Plan:  The plan is to proceed with Right Total Knee Replacement. The patient voiced understanding of the risks, benefits, and alternative forms of treatment that were discussed with Dr Willoughby at the time of scheduling.  Same day home health    Rani Urban, APRN  3/9/2023

## 2023-03-09 NOTE — H&P
Patient: Arely Crow    Date of Admission: 3/13/2023    YOB: 1965    Medical Record Number: [id number]    Admitting Physician: Dr. Uriel Willoughby    Reason for Admission: End Stage Right Knee OA    History of Present Illness: 57 y.o. female presents with severe end stage knee osteoarthritis which has not been responsive to the full compliment of conservative measures. Despite conservative attempts, there is still severe, constant activity-limiting pain. Given the severity of the pain, the patient has elected to proceed with knee replacement.    Allergies: No Known Allergies      Current Medications:  Home Medications:    Current Outpatient Medications on File Prior to Visit   Medication Sig   • acetaminophen (TYLENOL) 650 MG 8 hr tablet Take 1 tablet by mouth Every 8 (Eight) Hours As Needed for Mild Pain.   • Chlorhexidine Gluconate 2 % pads Apply  topically. As directed pre op   • DULoxetine (CYMBALTA) 30 MG capsule Take 1 capsule by mouth 2 (Two) Times a Day.   • etodolac (LODINE) 500 MG tablet Take 1 tablet by mouth 2 (Two) Times a Day. TO HOLD 1 WEEK BEFORE SURGERY   • HYDROcodone-acetaminophen (NORCO) 7.5-325 MG per tablet Take 1 tablet by mouth 2 (Two) Times a Day As Needed. HOLDING FOR SURGERY PER PT OWN REQUEST   • methIMAzole (TAPAZOLE) 5 MG tablet Take 2.5 mg by mouth Every Night.   • topiramate (TOPAMAX) 200 MG tablet Take 1 tablet by mouth 2 (Two) Times a Day.   • Diclofenac Sodium (VOLTAREN) 1 % gel gel Apply 4 g topically to the appropriate area as directed 2 (Two) Times a Day. INSTRUCTED TO ASK MD IF NEEDS TO HOLD FOR SURGERY   • rosuvastatin (CRESTOR) 10 MG tablet TAKE 1 TABLET BY MOUTH EVERY NIGHT (Patient taking differently: Take 1 tablet by mouth Every Night.)     No current facility-administered medications on file prior to visit.     PRN Meds:.    PMH:     Past Medical History:   Diagnosis Date   • Arthritis    • Female stress incontinence    • Focal seizure (HCC)    • History of  "hypertension     LOST WEIGHT OFF MEDS SINCE NOV 2022   • Right knee pain    • Thyroid nodule        PF/Surg/Soc Hx:     Past Surgical History:   Procedure Laterality Date   • COLONOSCOPY  2015   • HYSTERECTOMY     • KNEE ARTHROSCOPY Right    • THYROID BIOPSY     • TONSILLECTOMY          Social History     Occupational History   • Not on file   Tobacco Use   • Smoking status: Never   • Smokeless tobacco: Never   Vaping Use   • Vaping Use: Never used   Substance and Sexual Activity   • Alcohol use: Yes     Comment: rare   • Drug use: Never   • Sexual activity: Not Currently     Birth control/protection: Surgical      Social History     Social History Narrative   • Not on file        Family History   Problem Relation Age of Onset   • No Known Problems Mother    • No Known Problems Father    • Ovarian cancer Paternal Aunt    • Ovarian cancer Maternal Grandmother    • Dementia Maternal Grandmother    • Malig Hyperthermia Neg Hx          Review of Systems:   A 14 point review of systems was performed, pertinent positives discussed above, all other systems are negative    Physical Exam: 57 y.o. female  Vital Signs :   Vitals:    03/09/23 1405   BP: 124/80   BP Location: Right arm   Patient Position: Sitting   Cuff Size: Large Adult   Temp: 98.6 °F (37 °C)   TempSrc: Temporal   Weight: 97.5 kg (215 lb)   Height: 167.6 cm (65.98\")   PainSc:   6     General: Alert and Oriented x 3, No acute distress.  Psych: mood and affect appropriate; recent and remote memory intact  Eyes: conjunctivae clear; pupils equally round and reactive, sclerae anicteric  CV: no peripheral edema  Resp: normal respiratory effort  Skin: no rashes or wounds; normal turgor  Musculosketetal; pain and crepitance with knee range of motion  Vascular: palpable distal pulses    Xrays:  -3 views (AP, lateral, and sunrise) were reviewed demonstrating end-stage OA with bone on bone articulation.  -A full length AP xray was ordered and reviewed today for purposes " of operative alignment demonstrating end stage arthritic findings. There are no previous full length films for review    Assessment:  End-stage Right knee osteoarthritis. Conservative measures have failed.      Plan:  The plan is to proceed with Right Total Knee Replacement. The patient voiced understanding of the risks, benefits, and alternative forms of treatment that were discussed with Dr Willoughby at the time of scheduling.  Same day home health    Rani Urban, APRN  3/9/2023

## 2023-03-13 ENCOUNTER — ANESTHESIA (OUTPATIENT)
Dept: PERIOP | Facility: HOSPITAL | Age: 58
End: 2023-03-13
Payer: COMMERCIAL

## 2023-03-13 ENCOUNTER — ANESTHESIA EVENT (OUTPATIENT)
Dept: PERIOP | Facility: HOSPITAL | Age: 58
End: 2023-03-13
Payer: COMMERCIAL

## 2023-03-13 ENCOUNTER — HOME HEALTH ADMISSION (OUTPATIENT)
Dept: HOME HEALTH SERVICES | Facility: HOME HEALTHCARE | Age: 58
End: 2023-03-13
Payer: COMMERCIAL

## 2023-03-13 ENCOUNTER — APPOINTMENT (OUTPATIENT)
Dept: GENERAL RADIOLOGY | Facility: HOSPITAL | Age: 58
End: 2023-03-13
Payer: COMMERCIAL

## 2023-03-13 ENCOUNTER — HOSPITAL ENCOUNTER (OUTPATIENT)
Facility: HOSPITAL | Age: 58
Setting detail: HOSPITAL OUTPATIENT SURGERY
Discharge: HOME HEALTH | End: 2023-03-13
Attending: ORTHOPAEDIC SURGERY | Admitting: ORTHOPAEDIC SURGERY
Payer: COMMERCIAL

## 2023-03-13 VITALS
SYSTOLIC BLOOD PRESSURE: 125 MMHG | TEMPERATURE: 97.6 F | HEART RATE: 65 BPM | OXYGEN SATURATION: 97 % | RESPIRATION RATE: 18 BRPM | DIASTOLIC BLOOD PRESSURE: 75 MMHG

## 2023-03-13 DIAGNOSIS — M17.11 PRIMARY OSTEOARTHRITIS OF RIGHT KNEE: ICD-10-CM

## 2023-03-13 DIAGNOSIS — Z96.651 S/P TKR (TOTAL KNEE REPLACEMENT), RIGHT: Primary | ICD-10-CM

## 2023-03-13 PROCEDURE — 97161 PT EVAL LOW COMPLEX 20 MIN: CPT

## 2023-03-13 PROCEDURE — 27447 TOTAL KNEE ARTHROPLASTY: CPT | Performed by: ORTHOPAEDIC SURGERY

## 2023-03-13 PROCEDURE — 25010000002 PROPOFOL 10 MG/ML EMULSION: Performed by: NURSE ANESTHETIST, CERTIFIED REGISTERED

## 2023-03-13 PROCEDURE — 25010000002 MIDAZOLAM PER 1 MG: Performed by: STUDENT IN AN ORGANIZED HEALTH CARE EDUCATION/TRAINING PROGRAM

## 2023-03-13 PROCEDURE — 25010000002 CEFAZOLIN IN DEXTROSE 2-4 GM/100ML-% SOLUTION: Performed by: NURSE PRACTITIONER

## 2023-03-13 PROCEDURE — 25010000002 ROPIVACAINE PER 1 MG: Performed by: ORTHOPAEDIC SURGERY

## 2023-03-13 PROCEDURE — 97530 THERAPEUTIC ACTIVITIES: CPT

## 2023-03-13 PROCEDURE — 25010000002 ONDANSETRON PER 1 MG: Performed by: NURSE ANESTHETIST, CERTIFIED REGISTERED

## 2023-03-13 PROCEDURE — 25010000002 VANCOMYCIN 10 G RECONSTITUTED SOLUTION: Performed by: NURSE PRACTITIONER

## 2023-03-13 PROCEDURE — C1776 JOINT DEVICE (IMPLANTABLE): HCPCS | Performed by: ORTHOPAEDIC SURGERY

## 2023-03-13 PROCEDURE — 25010000002 ROPIVACAINE PER 1 MG: Performed by: STUDENT IN AN ORGANIZED HEALTH CARE EDUCATION/TRAINING PROGRAM

## 2023-03-13 PROCEDURE — 25010000002 MORPHINE PER 10 MG: Performed by: ORTHOPAEDIC SURGERY

## 2023-03-13 PROCEDURE — 25010000002 HYDROMORPHONE PER 4 MG: Performed by: NURSE ANESTHETIST, CERTIFIED REGISTERED

## 2023-03-13 PROCEDURE — C1713 ANCHOR/SCREW BN/BN,TIS/BN: HCPCS | Performed by: ORTHOPAEDIC SURGERY

## 2023-03-13 PROCEDURE — 25010000002 DEXAMETHASONE SODIUM PHOSPHATE 20 MG/5ML SOLUTION: Performed by: STUDENT IN AN ORGANIZED HEALTH CARE EDUCATION/TRAINING PROGRAM

## 2023-03-13 PROCEDURE — 73560 X-RAY EXAM OF KNEE 1 OR 2: CPT

## 2023-03-13 PROCEDURE — 25010000002 FENTANYL CITRATE (PF) 100 MCG/2ML SOLUTION: Performed by: NURSE ANESTHETIST, CERTIFIED REGISTERED

## 2023-03-13 PROCEDURE — 25010000002 FENTANYL CITRATE (PF) 50 MCG/ML SOLUTION: Performed by: STUDENT IN AN ORGANIZED HEALTH CARE EDUCATION/TRAINING PROGRAM

## 2023-03-13 PROCEDURE — 25010000002 EPINEPHRINE 1 MG/ML SOLUTION 30 ML VIAL: Performed by: ORTHOPAEDIC SURGERY

## 2023-03-13 PROCEDURE — 25010000002 FENTANYL CITRATE (PF) 50 MCG/ML SOLUTION: Performed by: NURSE ANESTHETIST, CERTIFIED REGISTERED

## 2023-03-13 PROCEDURE — 25010000002 KETOROLAC TROMETHAMINE PER 15 MG: Performed by: ORTHOPAEDIC SURGERY

## 2023-03-13 DEVICE — PALACOS® R IS A FAST-CURING, RADIOPAQUE, POLY(METHYL METHACRYLATE)-BASED BONE CEMENT.PALACOS ® R CONTAINS THE X-RAY CONTRAST MEDIUM ZIRCONIUM DIOXIDE. TO IMPROVE VISIBILITY IN THE SURGICAL FIELD PALACOS ® R HAS BEEN COLOURED WITH CHLOROPHYLL (E141). THE BONE CEMENT IS PREPARED DIRECTLY BEFORE USE BY MIXING A POLYMER POWDER COMPONENT WITH A LIQUID MONOMER COMPONENT. A DUCTILE DOUGH FORMS WHICH CURES WITHIN A FEW MINUTES.
Type: IMPLANTABLE DEVICE | Site: KNEE | Status: FUNCTIONAL
Brand: PALACOS®

## 2023-03-13 DEVICE — GENESIS II BICONVEX PATELLA 29MM
Type: IMPLANTABLE DEVICE | Site: KNEE | Status: FUNCTIONAL
Brand: GENESIS II

## 2023-03-13 DEVICE — LEGION CRUCIATE RETAINING OXINIUM                                    FEMORAL SIZE 4 RIGHT
Type: IMPLANTABLE DEVICE | Site: KNEE | Status: FUNCTIONAL
Brand: LEGION

## 2023-03-13 DEVICE — LEGION CRUCIATE RETAINING HIGH                                    FLEX HIGHLY CROSS LINKED                                    POLYETHYLENE SIZE 3-4 11MM
Type: IMPLANTABLE DEVICE | Site: KNEE | Status: FUNCTIONAL
Brand: LEGION

## 2023-03-13 DEVICE — KNOTLESS TISSUE CONTROL DEVICE, UNDYED UNIDIRECTIONAL (ANTIBACTERIAL) SYNTHETIC ABSORBABLE DEVICE
Type: IMPLANTABLE DEVICE | Site: KNEE | Status: FUNCTIONAL
Brand: STRATAFIX

## 2023-03-13 DEVICE — GENESIS II NON-POROUS TIBIAL                                    BASEPLATE SIZE 3 RIGHT
Type: IMPLANTABLE DEVICE | Site: KNEE | Status: FUNCTIONAL
Brand: GENESIS II

## 2023-03-13 DEVICE — VIOLET ANTIBACTERIAL POLYDIOXANONE, KNOTLESS TISSUE CONTROL DEVICE
Type: IMPLANTABLE DEVICE | Site: KNEE | Status: FUNCTIONAL
Brand: STRATAFIX

## 2023-03-13 DEVICE — IMPLANTABLE DEVICE: Type: IMPLANTABLE DEVICE | Status: FUNCTIONAL

## 2023-03-13 RX ORDER — HYDROMORPHONE HYDROCHLORIDE 1 MG/ML
0.5 INJECTION, SOLUTION INTRAMUSCULAR; INTRAVENOUS; SUBCUTANEOUS
Status: DISCONTINUED | OUTPATIENT
Start: 2023-03-13 | End: 2023-03-13 | Stop reason: HOSPADM

## 2023-03-13 RX ORDER — ROPIVACAINE HYDROCHLORIDE 5 MG/ML
INJECTION, SOLUTION EPIDURAL; INFILTRATION; PERINEURAL
Status: COMPLETED | OUTPATIENT
Start: 2023-03-13 | End: 2023-03-13

## 2023-03-13 RX ORDER — OXYCODONE AND ACETAMINOPHEN 7.5; 325 MG/1; MG/1
1 TABLET ORAL EVERY 4 HOURS PRN
Status: DISCONTINUED | OUTPATIENT
Start: 2023-03-13 | End: 2023-03-13 | Stop reason: HOSPADM

## 2023-03-13 RX ORDER — CEFAZOLIN SODIUM 2 G/100ML
2 INJECTION, SOLUTION INTRAVENOUS EVERY 8 HOURS
Status: DISCONTINUED | OUTPATIENT
Start: 2023-03-13 | End: 2023-03-13 | Stop reason: HOSPADM

## 2023-03-13 RX ORDER — ONDANSETRON 2 MG/ML
4 INJECTION INTRAMUSCULAR; INTRAVENOUS ONCE AS NEEDED
Status: DISCONTINUED | OUTPATIENT
Start: 2023-03-13 | End: 2023-03-13 | Stop reason: HOSPADM

## 2023-03-13 RX ORDER — CEFAZOLIN SODIUM 2 G/100ML
2 INJECTION, SOLUTION INTRAVENOUS ONCE
Status: COMPLETED | OUTPATIENT
Start: 2023-03-13 | End: 2023-03-13

## 2023-03-13 RX ORDER — LABETALOL HYDROCHLORIDE 5 MG/ML
5 INJECTION, SOLUTION INTRAVENOUS
Status: DISCONTINUED | OUTPATIENT
Start: 2023-03-13 | End: 2023-03-13 | Stop reason: HOSPADM

## 2023-03-13 RX ORDER — PROPOFOL 10 MG/ML
VIAL (ML) INTRAVENOUS AS NEEDED
Status: DISCONTINUED | OUTPATIENT
Start: 2023-03-13 | End: 2023-03-13 | Stop reason: SURG

## 2023-03-13 RX ORDER — UREA 10 %
1 LOTION (ML) TOPICAL NIGHTLY PRN
Status: DISCONTINUED | OUTPATIENT
Start: 2023-03-13 | End: 2023-03-13 | Stop reason: HOSPADM

## 2023-03-13 RX ORDER — SODIUM CHLORIDE 0.9 % (FLUSH) 0.9 %
3-10 SYRINGE (ML) INJECTION AS NEEDED
Status: DISCONTINUED | OUTPATIENT
Start: 2023-03-13 | End: 2023-03-13

## 2023-03-13 RX ORDER — IPRATROPIUM BROMIDE AND ALBUTEROL SULFATE 2.5; .5 MG/3ML; MG/3ML
3 SOLUTION RESPIRATORY (INHALATION) ONCE AS NEEDED
Status: DISCONTINUED | OUTPATIENT
Start: 2023-03-13 | End: 2023-03-13 | Stop reason: HOSPADM

## 2023-03-13 RX ORDER — FLUMAZENIL 0.1 MG/ML
0.2 INJECTION INTRAVENOUS AS NEEDED
Status: DISCONTINUED | OUTPATIENT
Start: 2023-03-13 | End: 2023-03-13 | Stop reason: HOSPADM

## 2023-03-13 RX ORDER — FENTANYL CITRATE 50 UG/ML
INJECTION, SOLUTION INTRAMUSCULAR; INTRAVENOUS AS NEEDED
Status: DISCONTINUED | OUTPATIENT
Start: 2023-03-13 | End: 2023-03-13 | Stop reason: SURG

## 2023-03-13 RX ORDER — PROMETHAZINE HYDROCHLORIDE 25 MG/1
25 TABLET ORAL ONCE AS NEEDED
Status: DISCONTINUED | OUTPATIENT
Start: 2023-03-13 | End: 2023-03-13 | Stop reason: HOSPADM

## 2023-03-13 RX ORDER — POVIDONE-IODINE 10 MG/ML
SOLUTION TOPICAL ONCE
Status: DISCONTINUED | OUTPATIENT
Start: 2023-03-13 | End: 2023-03-13

## 2023-03-13 RX ORDER — FENTANYL CITRATE 50 UG/ML
50 INJECTION, SOLUTION INTRAMUSCULAR; INTRAVENOUS
Status: DISCONTINUED | OUTPATIENT
Start: 2023-03-13 | End: 2023-03-13 | Stop reason: HOSPADM

## 2023-03-13 RX ORDER — METHIMAZOLE 5 MG/1
2.5 TABLET ORAL NIGHTLY
Status: CANCELLED | OUTPATIENT
Start: 2023-03-13

## 2023-03-13 RX ORDER — ROCURONIUM BROMIDE 10 MG/ML
INJECTION, SOLUTION INTRAVENOUS AS NEEDED
Status: DISCONTINUED | OUTPATIENT
Start: 2023-03-13 | End: 2023-03-13 | Stop reason: SURG

## 2023-03-13 RX ORDER — ASPIRIN 81 MG/1
TABLET ORAL
Qty: 60 TABLET | Refills: 0 | Status: SHIPPED | OUTPATIENT
Start: 2023-03-13 | End: 2023-04-28

## 2023-03-13 RX ORDER — PANTOPRAZOLE SODIUM 40 MG/1
40 TABLET, DELAYED RELEASE ORAL DAILY
Qty: 14 TABLET | Refills: 0 | Status: SHIPPED | OUTPATIENT
Start: 2023-03-13 | End: 2023-03-28

## 2023-03-13 RX ORDER — ONDANSETRON 4 MG/1
4 TABLET, FILM COATED ORAL EVERY 6 HOURS PRN
Status: DISCONTINUED | OUTPATIENT
Start: 2023-03-13 | End: 2023-03-13 | Stop reason: HOSPADM

## 2023-03-13 RX ORDER — EPHEDRINE SULFATE 50 MG/ML
5 INJECTION, SOLUTION INTRAVENOUS ONCE AS NEEDED
Status: DISCONTINUED | OUTPATIENT
Start: 2023-03-13 | End: 2023-03-13 | Stop reason: HOSPADM

## 2023-03-13 RX ORDER — LIDOCAINE HYDROCHLORIDE 20 MG/ML
INJECTION, SOLUTION EPIDURAL; INFILTRATION; INTRACAUDAL; PERINEURAL AS NEEDED
Status: DISCONTINUED | OUTPATIENT
Start: 2023-03-13 | End: 2023-03-13 | Stop reason: SURG

## 2023-03-13 RX ORDER — SODIUM CHLORIDE, SODIUM LACTATE, POTASSIUM CHLORIDE, CALCIUM CHLORIDE 600; 310; 30; 20 MG/100ML; MG/100ML; MG/100ML; MG/100ML
9 INJECTION, SOLUTION INTRAVENOUS CONTINUOUS
Status: DISCONTINUED | OUTPATIENT
Start: 2023-03-13 | End: 2023-03-13 | Stop reason: HOSPADM

## 2023-03-13 RX ORDER — PROMETHAZINE HYDROCHLORIDE 25 MG/1
25 SUPPOSITORY RECTAL ONCE AS NEEDED
Status: DISCONTINUED | OUTPATIENT
Start: 2023-03-13 | End: 2023-03-13 | Stop reason: HOSPADM

## 2023-03-13 RX ORDER — PROMETHAZINE HYDROCHLORIDE 25 MG/1
12.5 TABLET ORAL EVERY 4 HOURS PRN
Status: DISCONTINUED | OUTPATIENT
Start: 2023-03-13 | End: 2023-03-13 | Stop reason: HOSPADM

## 2023-03-13 RX ORDER — HYDROCODONE BITARTRATE AND ACETAMINOPHEN 7.5; 325 MG/1; MG/1
1 TABLET ORAL ONCE AS NEEDED
Status: COMPLETED | OUTPATIENT
Start: 2023-03-13 | End: 2023-03-13

## 2023-03-13 RX ORDER — HYDROCODONE BITARTRATE AND ACETAMINOPHEN 7.5; 325 MG/1; MG/1
1 TABLET ORAL EVERY 4 HOURS PRN
Qty: 56 TABLET | Refills: 0 | Status: SHIPPED | OUTPATIENT
Start: 2023-03-13

## 2023-03-13 RX ORDER — HYDROCODONE BITARTRATE AND ACETAMINOPHEN 7.5; 325 MG/1; MG/1
1 TABLET ORAL EVERY 4 HOURS PRN
Status: DISCONTINUED | OUTPATIENT
Start: 2023-03-13 | End: 2023-03-13 | Stop reason: HOSPADM

## 2023-03-13 RX ORDER — CHLORHEXIDINE GLUCONATE 500 MG/1
CLOTH TOPICAL 2 TIMES DAILY
Status: DISCONTINUED | OUTPATIENT
Start: 2023-03-13 | End: 2023-03-13

## 2023-03-13 RX ORDER — LIDOCAINE HYDROCHLORIDE 10 MG/ML
0.5 INJECTION, SOLUTION EPIDURAL; INFILTRATION; INTRACAUDAL; PERINEURAL ONCE AS NEEDED
Status: DISCONTINUED | OUTPATIENT
Start: 2023-03-13 | End: 2023-03-13 | Stop reason: HOSPADM

## 2023-03-13 RX ORDER — HYDROCODONE BITARTRATE AND ACETAMINOPHEN 7.5; 325 MG/1; MG/1
2 TABLET ORAL EVERY 4 HOURS PRN
Status: DISCONTINUED | OUTPATIENT
Start: 2023-03-13 | End: 2023-03-13 | Stop reason: HOSPADM

## 2023-03-13 RX ORDER — ONDANSETRON 4 MG/1
4 TABLET, FILM COATED ORAL EVERY 8 HOURS PRN
Qty: 10 TABLET | Refills: 0 | Status: SHIPPED | OUTPATIENT
Start: 2023-03-13

## 2023-03-13 RX ORDER — NALOXONE HCL 0.4 MG/ML
0.2 VIAL (ML) INJECTION AS NEEDED
Status: DISCONTINUED | OUTPATIENT
Start: 2023-03-13 | End: 2023-03-13 | Stop reason: HOSPADM

## 2023-03-13 RX ORDER — DIPHENHYDRAMINE HYDROCHLORIDE 50 MG/ML
12.5 INJECTION INTRAMUSCULAR; INTRAVENOUS
Status: DISCONTINUED | OUTPATIENT
Start: 2023-03-13 | End: 2023-03-13 | Stop reason: HOSPADM

## 2023-03-13 RX ORDER — DROPERIDOL 2.5 MG/ML
0.62 INJECTION, SOLUTION INTRAMUSCULAR; INTRAVENOUS
Status: DISCONTINUED | OUTPATIENT
Start: 2023-03-13 | End: 2023-03-13 | Stop reason: HOSPADM

## 2023-03-13 RX ORDER — MIDAZOLAM HYDROCHLORIDE 1 MG/ML
1 INJECTION INTRAMUSCULAR; INTRAVENOUS
Status: DISCONTINUED | OUTPATIENT
Start: 2023-03-13 | End: 2023-03-13 | Stop reason: HOSPADM

## 2023-03-13 RX ORDER — DEXAMETHASONE SODIUM PHOSPHATE 4 MG/ML
INJECTION, SOLUTION INTRA-ARTICULAR; INTRALESIONAL; INTRAMUSCULAR; INTRAVENOUS; SOFT TISSUE
Status: COMPLETED | OUTPATIENT
Start: 2023-03-13 | End: 2023-03-13

## 2023-03-13 RX ORDER — HYDRALAZINE HYDROCHLORIDE 20 MG/ML
5 INJECTION INTRAMUSCULAR; INTRAVENOUS
Status: DISCONTINUED | OUTPATIENT
Start: 2023-03-13 | End: 2023-03-13 | Stop reason: HOSPADM

## 2023-03-13 RX ORDER — SODIUM CHLORIDE 0.9 % (FLUSH) 0.9 %
3 SYRINGE (ML) INJECTION EVERY 12 HOURS SCHEDULED
Status: DISCONTINUED | OUTPATIENT
Start: 2023-03-13 | End: 2023-03-13

## 2023-03-13 RX ORDER — TRANEXAMIC ACID 100 MG/ML
INJECTION, SOLUTION INTRAVENOUS AS NEEDED
Status: DISCONTINUED | OUTPATIENT
Start: 2023-03-13 | End: 2023-03-13 | Stop reason: SURG

## 2023-03-13 RX ORDER — PREGABALIN 75 MG/1
150 CAPSULE ORAL ONCE
Status: COMPLETED | OUTPATIENT
Start: 2023-03-13 | End: 2023-03-13

## 2023-03-13 RX ORDER — MAGNESIUM HYDROXIDE 1200 MG/15ML
LIQUID ORAL AS NEEDED
Status: DISCONTINUED | OUTPATIENT
Start: 2023-03-13 | End: 2023-03-13 | Stop reason: HOSPADM

## 2023-03-13 RX ORDER — GLYCOPYRROLATE 0.2 MG/ML
INJECTION INTRAMUSCULAR; INTRAVENOUS AS NEEDED
Status: DISCONTINUED | OUTPATIENT
Start: 2023-03-13 | End: 2023-03-13 | Stop reason: SURG

## 2023-03-13 RX ORDER — MELOXICAM 15 MG/1
15 TABLET ORAL DAILY
Qty: 14 TABLET | Refills: 0 | Status: SHIPPED | OUTPATIENT
Start: 2023-03-13 | End: 2023-03-28

## 2023-03-13 RX ORDER — ACETAMINOPHEN 10 MG/ML
INJECTION, SOLUTION INTRAVENOUS AS NEEDED
Status: DISCONTINUED | OUTPATIENT
Start: 2023-03-13 | End: 2023-03-13 | Stop reason: SURG

## 2023-03-13 RX ORDER — ONDANSETRON 2 MG/ML
INJECTION INTRAMUSCULAR; INTRAVENOUS AS NEEDED
Status: DISCONTINUED | OUTPATIENT
Start: 2023-03-13 | End: 2023-03-13 | Stop reason: SURG

## 2023-03-13 RX ORDER — TOPIRAMATE 100 MG/1
200 TABLET, FILM COATED ORAL 2 TIMES DAILY
Status: CANCELLED | OUTPATIENT
Start: 2023-03-13

## 2023-03-13 RX ORDER — DULOXETIN HYDROCHLORIDE 30 MG/1
30 CAPSULE, DELAYED RELEASE ORAL 2 TIMES DAILY
Status: CANCELLED | OUTPATIENT
Start: 2023-03-13

## 2023-03-13 RX ORDER — MELOXICAM 15 MG/1
15 TABLET ORAL ONCE
Status: COMPLETED | OUTPATIENT
Start: 2023-03-13 | End: 2023-03-13

## 2023-03-13 RX ORDER — POLYETHYLENE GLYCOL 3350 17 G/17G
17 POWDER, FOR SOLUTION ORAL 2 TIMES DAILY
Qty: 238 G | Refills: 0 | Status: SHIPPED | OUTPATIENT
Start: 2023-03-13 | End: 2023-03-20

## 2023-03-13 RX ORDER — ACETAMINOPHEN 325 MG/1
650 TABLET ORAL EVERY 6 HOURS PRN
Status: DISCONTINUED | OUTPATIENT
Start: 2023-03-13 | End: 2023-03-13 | Stop reason: HOSPADM

## 2023-03-13 RX ORDER — ASPIRIN 81 MG/1
81 TABLET ORAL EVERY 12 HOURS SCHEDULED
Status: DISCONTINUED | OUTPATIENT
Start: 2023-03-14 | End: 2023-03-13 | Stop reason: HOSPADM

## 2023-03-13 RX ADMIN — DEXAMETHASONE SODIUM PHOSPHATE 8 MG: 4 INJECTION, SOLUTION INTRAMUSCULAR; INTRAVENOUS at 07:11

## 2023-03-13 RX ADMIN — FENTANYL CITRATE 50 MCG: 50 INJECTION, SOLUTION INTRAMUSCULAR; INTRAVENOUS at 05:57

## 2023-03-13 RX ADMIN — HYDROMORPHONE HYDROCHLORIDE 0.5 MG: 1 INJECTION, SOLUTION INTRAMUSCULAR; INTRAVENOUS; SUBCUTANEOUS at 09:33

## 2023-03-13 RX ADMIN — FENTANYL CITRATE 50 MCG: 50 INJECTION, SOLUTION INTRAMUSCULAR; INTRAVENOUS at 07:09

## 2023-03-13 RX ADMIN — ONDANSETRON 4 MG: 2 INJECTION INTRAMUSCULAR; INTRAVENOUS at 07:53

## 2023-03-13 RX ADMIN — FENTANYL CITRATE 50 MCG: 50 INJECTION, SOLUTION INTRAMUSCULAR; INTRAVENOUS at 07:19

## 2023-03-13 RX ADMIN — PREGABALIN 150 MG: 75 CAPSULE ORAL at 05:44

## 2023-03-13 RX ADMIN — LIDOCAINE HYDROCHLORIDE 50 MG: 20 INJECTION, SOLUTION EPIDURAL; INFILTRATION; INTRACAUDAL; PERINEURAL at 07:05

## 2023-03-13 RX ADMIN — MIDAZOLAM 1 MG: 1 INJECTION INTRAMUSCULAR; INTRAVENOUS at 05:57

## 2023-03-13 RX ADMIN — GLYCOPYRROLATE 0.2 MCG: 1 INJECTION INTRAMUSCULAR; INTRAVENOUS at 08:00

## 2023-03-13 RX ADMIN — PROPOFOL 250 MCG/KG/MIN: 10 INJECTION, EMULSION INTRAVENOUS at 07:07

## 2023-03-13 RX ADMIN — ROPIVACAINE HYDROCHLORIDE 14 ML: 5 INJECTION, SOLUTION EPIDURAL; INFILTRATION; PERINEURAL at 06:02

## 2023-03-13 RX ADMIN — FENTANYL CITRATE 50 MCG: 50 INJECTION, SOLUTION INTRAMUSCULAR; INTRAVENOUS at 08:58

## 2023-03-13 RX ADMIN — SUGAMMADEX 200 MG: 100 INJECTION, SOLUTION INTRAVENOUS at 08:34

## 2023-03-13 RX ADMIN — VANCOMYCIN HYDROCHLORIDE 1500 MG: 10 INJECTION, POWDER, LYOPHILIZED, FOR SOLUTION INTRAVENOUS at 05:44

## 2023-03-13 RX ADMIN — TRANEXAMIC ACID 1000 MG: 1 INJECTION, SOLUTION INTRAVENOUS at 07:57

## 2023-03-13 RX ADMIN — PROPOFOL 200 MG: 10 INJECTION, EMULSION INTRAVENOUS at 07:05

## 2023-03-13 RX ADMIN — SODIUM CHLORIDE, POTASSIUM CHLORIDE, SODIUM LACTATE AND CALCIUM CHLORIDE 9 ML/HR: 600; 310; 30; 20 INJECTION, SOLUTION INTRAVENOUS at 05:51

## 2023-03-13 RX ADMIN — MELOXICAM 15 MG: 15 TABLET ORAL at 05:44

## 2023-03-13 RX ADMIN — HYDROCODONE BITARTRATE AND ACETAMINOPHEN 1 TABLET: 7.5; 325 TABLET ORAL at 09:11

## 2023-03-13 RX ADMIN — ROCURONIUM BROMIDE 50 MG: 10 INJECTION, SOLUTION INTRAVENOUS at 07:06

## 2023-03-13 RX ADMIN — DEXAMETHASONE SODIUM PHOSPHATE 4 MG: 4 INJECTION, SOLUTION INTRAMUSCULAR; INTRAVENOUS at 06:02

## 2023-03-13 RX ADMIN — ACETAMINOPHEN 1000 MG: 10 INJECTION, SOLUTION INTRAVENOUS at 07:10

## 2023-03-13 RX ADMIN — CEFAZOLIN SODIUM 2 G: 2 INJECTION, SOLUTION INTRAVENOUS at 06:54

## 2023-03-13 NOTE — ANESTHESIA PROCEDURE NOTES
Airway  Urgency: elective    Date/Time: 3/13/2023 7:09 AM  Difficult airway    General Information and Staff    Patient location during procedure: OR  Anesthesiologist: Grzegorz Paez MD  CRNA/CAA: Sofi Boggs CRNA    Indications and Patient Condition    Preoxygenated: yes  Mask difficulty assessment: 1 - vent by mask    Final Airway Details  Final airway type: endotracheal airway      Successful airway: ETT  Cuffed: yes   Successful intubation technique: direct laryngoscopy  Facilitating devices/methods: intubating stylet  Endotracheal tube insertion site: oral  Blade: CMAC  Blade size: D  ETT size (mm): 7.0  Cormack-Lehane Classification: grade IIa - partial view of glottis  Placement verified by: chest auscultation and capnometry   Measured from: teeth  ETT/EBT  to teeth (cm): 20  Number of attempts at approach: 2  Assessment: lips, teeth, and gum same as pre-op and atraumatic intubation

## 2023-03-13 NOTE — PLAN OF CARE
Goal Outcome Evaluation:              Outcome Evaluation: Pt is 56 yo female presenting s/p R TKA w expected post op decresaed strength and ROM.  At time of PT eval pt was required CGA for transfers and ambulation w RWx.  Pt was able to ambulate 80' w RWx this am.  Pt was also able to ascend and descend 4 steps w 1 HR and CGA-Min A x1.  Pt is to d/c home this date w family to assist and HHPT.  Pt completed TKA ther ex protocal x10 reps and was educated to complete ther ex again this evening at 15 reps.  RWx provided to pt from [place].

## 2023-03-13 NOTE — ANESTHESIA POSTPROCEDURE EVALUATION
Patient: Arely Crow    Procedure Summary     Date: 03/13/23 Room / Location:  [facility] [address] [place] [address] [facility]    Anesthesia Start: 0656 Anesthesia Stop: 0857    Procedure: TOTAL KNEE ARTHROPLASTY (Right: Knee) Diagnosis:       Primary osteoarthritis of right knee      (Primary osteoarthritis of right knee [M17.11])    Surgeons: Uriel Willoughby MD Provider: Grzegorz Paez MD    Anesthesia Type: general ASA Status: 2          Anesthesia Type: general    Vitals  Vitals Value Taken Time   /72 03/13/23 1000   Temp 36.4 °C (97.6 °F) 03/13/23 1000   Pulse 88 03/13/23 1002   Resp 18 03/13/23 1000   SpO2 99 % 03/13/23 1002   Vitals shown include unvalidated device data.        Post Anesthesia Care and Evaluation    Patient location during evaluation: bedside  Patient participation: complete - patient participated  Level of consciousness: awake and alert  Pain management: adequate    Airway patency: patent  Anesthetic complications: No anesthetic complications  PONV Status: controlled  Cardiovascular status: blood pressure returned to baseline and acceptable  Respiratory status: acceptable  Hydration status: acceptable

## 2023-03-13 NOTE — ANESTHESIA PREPROCEDURE EVALUATION
Anesthesia Evaluation     Patient summary reviewed and Nursing notes reviewed   no history of anesthetic complications:  NPO Solid Status: > 8 hours  NPO Liquid Status: > 2 hours           Airway   Mallampati: II  TM distance: >3 FB  Neck ROM: full  Dental      Pulmonary    Cardiovascular     ECG reviewed        Neuro/Psych  (+) seizures,    GI/Hepatic/Renal/Endo    (+) obesity,   thyroid problem     Musculoskeletal     Abdominal    Substance History      OB/GYN          Other   arthritis,                      Anesthesia Plan    ASA 2     general     intravenous induction     Anesthetic plan, risks, benefits, and alternatives have been provided, discussed and informed consent has been obtained with: patient.        CODE STATUS:

## 2023-03-13 NOTE — ANESTHESIA PROCEDURE NOTES
Peripheral Block    Pre-sedation assessment completed: 3/13/2023 5:59 AM    Patient reassessed immediately prior to procedure    Patient location during procedure: pre-op  Start time: 3/13/2023 6:00 AM  Stop time: 3/13/2023 6:02 AM  Reason for block: at surgeon's request and post-op pain management  Performed by  Anesthesiologist: Grzegorz Paez MD  Preanesthetic Checklist  Completed: patient identified, IV checked, site marked, risks and benefits discussed, surgical consent, monitors and equipment checked, pre-op evaluation and timeout performed  Prep:  Pt Position: supine  Sterile barriers:mask, gloves and cap  Prep: ChloraPrep  Patient monitoring: blood pressure monitoring, continuous pulse oximetry and EKG  Procedure    Sedation: yes  Performed under: local infiltration  Guidance:ultrasound guided    ULTRASOUND INTERPRETATION.  Using ultrasound guidance a 21 G gauge needle was placed in close proximity to the nerve, at which point, under ultrasound guidance anesthetic was injected in the area of the nerve and spread of the anesthesia was seen on ultrasound in close proximity thereto.  There were no abnormalities seen on ultrasound; a digital image was taken; and the patient tolerated the procedure with no complications. Images:still images obtained, printed/placed on chart    Laterality:right  Block Type:adductor canal block  Injection Technique:single-shot  Needle Type:echogenic and Tuohy  Needle Gauge:21 G  Resistance on Injection: none    Medications Used: ropivacaine (NAROPIN) 0.5 % injection - Injection   14 mL - 3/13/2023 6:02:00 AM  dexamethasone (DECADRON) injection - Injection   4 mg - 3/13/2023 6:02:00 AM      Post Assessment  Injection Assessment: negative aspiration for heme, no paresthesia on injection and incremental injection  Patient Tolerance:comfortable throughout block  Complications:no  Additional Notes  Ultrasound guidance used to visualize nerve anatomy, guide needle placement and  verify local anesthetic disbursement.

## 2023-03-13 NOTE — OP NOTE
Name: Arely Crow    YOB: 1965    DATE OF SURGERY: 3/13/2023    PREOPERATIVE DIAGNOSIS: Right knee end-stage osteoarthritis    POSTOPERATIVE DIAGNOSIS: Right knee end-stage osteoarthritis    PROCEDURE PERFORMED: Right total knee replacement     SURGEON: Uriel Willoughby M.D.    ASSISTANT: Beni Valdez MD    A surgical assistant was integral in ensuring a successful outcome with this procedure.  The assistant was utilized to assist in positioning the patient, draping the patient, was used throughout the case to provide with retraction of tissues, suctioning of blood and body fluids for visualization, positioning of the extremity to allow for proper exposure so that I could perform the procedure.  Without the use of a surgical assistant during this procedure I feel that the outcome may have been compromised or would have been suboptimal or at risk for complications.    IMPLANTS: [employer]:     Implant Name Type Inv. Item Serial No.  Lot No. LRB No. Used Action   CMT BONE PALACOS R HI/VISC 1X40 - [id number] Implant CMT BONE PALACOS R HI/VISC 1X40  [facility] [id number] Right 1 Implanted   DEV CONTRL TISS STRATAFIX SYMM PDS PLUS ANEESH CT-1 60CM - [id number] Implant DEV CONTRL TISS STRATAFIX SYMM PDS PLUS ANEESH CT-1 60CM  [employer]  DIV OF J AND J [id number] Right 1 Implanted   DEV CONTRL TISS STRATAFIXSPIRALMNCRYL PLSPS2 REV3/0 45CM - [id number] Implant DEV CONTRL TISS STRATAFIXSPIRALMNCRYL PLSPS2 REV3/0 45CM  [employer]  DIV OF J AND J [id number] Right 1 Implanted   BASE TIB/KN GEN2 NONPOR TI SZ3 RT - [id number] Implant BASE TIB/KN GEN2 NONPOR TI SZ3 RT  [employer] [id number] Right 1 Implanted   COMP FEM LEGION OXINIUM CR SZ4 RT - [id number] Implant COMP FEM LEGION OXINIUM CR SZ4 RT  [employer] [id number] Right 1 Implanted   PAT GEN2 BICONVEX 61P12PM - [id number] Implant PAT GEN2 BICONVEX [id number]  [employer] [id number] Right 1 Implanted   INSRT ART/KN LEGION CR HF XLPE SZ3TO4  11MM - [id number] Implant INSRT ART/KN LEGION CR HF XLPE SZ3TO4 11MM  [employer] [id number] Right 1 Implanted       Estimated Blood Loss: 200cc  Specimens : none  Complications: none    DESCRIPTION OF PROCEDURE: The patient was taken to the operating room and placed in the supine position. A sequential compression device was carefully placed on the non-operative leg. Preoperative antibiotics were administered. Surgical time out was performed. After adequate induction of anesthesia, the leg was prepped and draped in the usual sterile fashion, exsanguinated with an Esmarch bandage and the tourniquet inflated to 250 mmHg. A midline incision was performed followed by a medial parapatellar arthrotomy. The patella was subluxed laterally.  A portion of the fat pad, ACL, and anterior horns of the meniscus were excised.  A drill hole was then placed in the center of the femoral canal in line with the canal.  It was irrigated and suctioned.  The intramedullary roberto was then placed and a 5 degree distal valgus cut was performed after the block pinned in place appropriately.  Cut surface was then removed.  The sizing and rotation guide was then placed and seated appropriately.  It was sized and then the drill holes for the 4-in-1 cutting guide were placed in 3 degrees of external rotation based off of the posterior condyles.  The 4-in-1 cutting block was then placed and the femoral cuts were performed.  The excess bone was removed and the cut surfaces looked good.  At this point we placed the retractors around the proximal tibia and a slight release of the PCL fibers off of the posterior proximal tibia was performed.  We used the extramedullary tibial alignment guide and it was aligned appropriately and then the depth was set and the block pinned in place.  The tibial cut was then performed and the alignment guide was removed.  The tibial cut was removed and the cut surface looked good.  The posterior horns of the menisci  were then removed as well as the posterior osteophytes.  Flexion extension blocks were then used to check the balance of the knee. The tibial cut surface was then sized with the sizing templates and the tibial and femoral trial were then placed. The knee was placed in full extension and then the tibial tray rotation was then matched to the femoral rotation and marked.    Attention was then placed to the patella. The patella was noted to track centrally through range of motion. The patella was then sized with the trials. The thickness of the patella was then measured. The patella was resurfaced and the surrounding osteophytes were removed. The preoperative thickness was reproduced. The patella tracked centrally through range of motion.  We then checked the balance with the trial implants in place and there was excellent medial lateral and flexion-extension balance.  The tibial and femoral arrays were then removed.  The checkpoint markers were then removed.   At this point all trial components were removed, the knee was copiously irrigated with pulsed lavage, and the knee was injected with anesthetic cocktail solution. The cut surfaces were then dried with clean lap sponges, and the components were cemented tibia, followed by femur, then patella. The knee was held in full extension and all excess cement was removed. The knee was held still until the cement had completely hardened. We then placed the trial polyethylene spacer which resulted in full extension and excellent flexion-extension balance. We placed the final polyethylene spacer.   The knee was then copiously irrigated. The tourniquet was then released. There was excellent hemostasis. We placed a one-eighth inch Hemovac drain. We closed the knee in multiple layers in standard fashion. Sterile dressing were applied. At the end of the case, the sponge and needle counts were reported as being correct. There were no known complications. The patient was then  transported to the recovery room.      Uriel Willoughby M.D.

## 2023-03-13 NOTE — DISCHARGE PLACEMENT REQUEST
"Arely Crow (57 y.o. Female)    YOB: 1965  Social Security Number:   Address: [address] [place], [place] [address]  [place] [id number]  Home Phone: [phone]  MRN: [id number]  Temple: L[employer]-day  Marital Status:         Admission Date: 3/13/23  Admission Type: Elective  Admitting Provider: Uriel Willoughby MD  Attending Provider: Uriel Willoughby MD  Department, Room/Bed: [facility], [facility]  Discharge Date:   Discharge Disposition: [place] [facility] [place]  [place]charge Destination:               Attending Provider: Uriel Willoughby MD    Allergies: No Known Allergies    Isolation: None   Infection: None   Code Status: Not on file    Ht: 167.6 cm (65.98\")   Wt: 97.5 kg (215 lb)    Admission Cmt: None   Principal Problem: Primary osteoarthritis of right knee [M17.11]                 Active Insurance as of 3/13/2023     Primary Coverage     Payor Plan Insurance Group Employer/Plan Group    HUMANA HUMANA [id number]     Payor Plan Address Payor Plan Phone Number Payor Plan Fax Number Effective Dates    [address] [address] [phone]  6/1/2020 - None Entered    [facility] [address]       Subscriber Name Subscriber Birth Date Member ID       ARELY CROW 1965 [id number]                 Emergency Contacts      (Rel.) Home Phone Work Phone Mobile Phone    Marylou Andrew (Mother) [phone] None None    ROMANCAROLANN (Son) None None [phone]              "

## 2023-03-13 NOTE — PROGRESS NOTES
Patient is a same day ortho discharge. Tried to call emergency numbers and patient and unable to confirm face sheet . Have left messages . Orders for home health PT in [facility]. Thank you !

## 2023-03-13 NOTE — THERAPY EVALUATION
Patient Name: Arely Crow  : 1965    MRN: [id number]                              Today's Date: 3/13/2023       Admit Date: 3/13/2023    Visit Dx:     ICD-10-CM ICD-9-CM   1. S/P TKR (total knee replacement), right  Z96.651 V43.65   2. Primary osteoarthritis of right knee  M17.11 715.16     Patient Active Problem List   Diagnosis   • Vitamin D deficiency   • Morbid obesity (HCC)   • Chronic fatigue   • Abnormal thyroid function test   • Menopausal symptoms   • Constipation   • Nontoxic multinodular goiter   • Vitamin D deficiency disease   • Low back pain   • Primary osteoarthritis of right knee     Past Medical History:   Diagnosis Date   • Arthritis    • Female stress incontinence    • Focal seizure (HCC)    • History of hypertension     LOST WEIGHT OFF MEDS SINCE 2022   • Right knee pain    • Thyroid nodule      Past Surgical History:   Procedure Laterality Date   • COLONOSCOPY     • HYSTERECTOMY     • KNEE ARTHROSCOPY Right    • THYROID BIOPSY     • TONSILLECTOMY        General Information     Row Name 23 1214          Physical Therapy Time and Intention    Document Type evaluation  -MD     Mode of Treatment physical therapy  -MD     Row Name 23 1214          General Information    Patient Profile Reviewed yes  -MD     Prior Level of Function independent:  -MD     Existing Precautions/Restrictions fall  -MD     Barriers to Rehab none identified  -MD     Row Name 23 1214          Living Environment    People in Home other relative(s)  -MD     Row Name 23 1214          Home Main Entrance    Number of Stairs, Main Entrance four  -MD     Stair Railings, Main Entrance railings safe and in good condition;railings on both sides of stairs  -MD     Row Name 23 1214          Stairs Within Home, Primary    Number of Stairs, Within Home, Primary none  -MD     Row Name 23 1214          Cognition    Orientation Status (Cognition) oriented x 3  -MD           User Key  (r)  = Recorded By, (t) = Taken By, (c) = Cosigned By    Initials Name Provider Type    Sofi Rodriguez, PT Physical Therapist               Mobility     Row Name 03/13/23 1215          Bed Mobility    Comment, (Bed Mobility) NT pt up in chair  -MD     Row Name 03/13/23 1215          Sit-Stand Transfer    Sit-Stand [place] (Transfers) verbal cues;contact guard  -MD     Assistive Device (Sit-Stand Transfers) walker, front-wheeled  -MD     Row Name 03/13/23 1215          Gait/Stairs (Locomotion)    [place] Level (Gait) verbal cues;contact guard;standby assist  -MD     Assistive Device (Gait) walker, front-wheeled  -MD     Ambulated day of surgery or within 4 hours of PACU discharge yes  -MD     Distance in Feet (Gait) 80'  -MD     Deviations/Abnormal Patterns (Gait) antalgic  -MD     Bilateral Gait Deviations forward flexed posture  -MD     [place] Level (Stairs) contact guard;minimum assist (75% patient effort)  -MD     Handrail Location (Stairs) right side (ascending);left side (descending)  -MD     Number of Steps (Stairs) 4  -MD     Ascending Technique (Stairs) step-to-step  -MD     Descending Technique (Stairs) step-to-step  -MD           User Key  (r) = Recorded By, (t) = Taken By, (c) = Cosigned By    Initials Name Provider Type    Sofi Rodriguez, PT Physical Therapist               Obj/Interventions     Row Name 03/13/23 1216          Range of Motion Comprehensive    General Range of Motion no range of motion deficits identified  -MD     Comment, General Range of Motion except R LE  -MD     Row Name 03/13/23 1216          Strength Comprehensive (MMT)    General Manual Muscle Testing (MMT) Assessment no strength deficits identified  -MD     Comment, General Manual Muscle Testing (MMT) Assessment except R LE  -MD     Row Name 03/13/23 1216          Motor Skills    Therapeutic Exercise --  TKA protocal x10 reps  -MD           User Key  (r) = Recorded By, (t) = Taken By, (c) = Cosigned By    Initials Name  Provider Type    Sofi Rodriguez, PT Physical Therapist               Goals/Plan    No documentation.                Clinical Impression     Row Name 03/13/23 1217          Pain    Pretreatment Pain Rating 0/10 - no pain  -MD     Row Name 03/13/23 1217          Plan of Care Review    Outcome Evaluation Pt is 58 yo female presenting s/p R TKA w expected post op decresaed strength and ROM.  At time of PT eval pt was required CGA for transfers and ambulation w RWx.  Pt was able to ambulate 80' w RWx this am.  Pt was also able to ascend and descend 4 steps w 1 HR and CGA-Min A x1.  Pt is to d/c home this date w family to assist and HHPT.  Pt completed TKA ther ex protocal x10 reps and was educated to complete ther ex again this evening at 15 reps.  RWx provided to pt from [place].  -MD     Row Name 03/13/23 1217          Therapy Assessment/Plan (PT)    Criteria for Skilled Interventions Met (PT) no;no problems identified which require skilled intervention  from acute care PT setting  -MD     Therapy Frequency (PT) evaluation only  -MD     Row Name 03/13/23 1217          Positioning and Restraints    Pre-Treatment Position sitting in chair/recliner  -MD     Post Treatment Position chair  -MD     In Chair reclined;sitting;call light within reach;exit alarm on  -MD           User Key  (r) = Recorded By, (t) = Taken By, (c) = Cosigned By    Initials Name Provider Type    Sofi Rodriguez, PT Physical Therapist               Outcome Measures     Row Name 03/13/23 1225          How much help from another person do you currently need...    Turning from your back to your side while in flat bed without using bedrails? 4  -MD     Moving from lying on back to sitting on the side of a flat bed without bedrails? 4  -MD     Moving to and from a bed to a chair (including a wheelchair)? 3  -MD     Standing up from a chair using your arms (e.g., wheelchair, bedside chair)? 3  -MD     Climbing 3-5 steps with a railing? 3  -MD     To walk in  hospital room? 3  -MD     AM-PAC 6 Clicks Score (PT) 20  -MD     Highest level of mobility 6 --> Walked 10 steps or more  -MD     Row Name 03/13/23 1225          Functional Assessment    Outcome Measure Options AM-PAC 6 Clicks Basic Mobility (PT)  -MD           User Key  (r) = Recorded By, (t) = Taken By, (c) = Cosigned By    Initials Name Provider Type    Sofi Rodriguez PT Physical Therapist                               PT Recommendation and Plan     Outcome Evaluation: Pt is 58 yo female presenting s/p R TKA w expected post op decresaed strength and ROM.  At time of PT eval pt was required CGA for transfers and ambulation w RWx.  Pt was able to ambulate 80' w RWx this am.  Pt was also able to ascend and descend 4 steps w 1 HR and CGA-Min A x1.  Pt is to d/c home this date w family to assist and HHPT.  Pt completed TKA ther ex protocal x10 reps and was educated to complete ther ex again this evening at 15 reps.  RWx provided to pt from [place].     Time Calculation:    PT Charges     Row Name 03/13/23 1126             Time Calculation    Start Time 1050  -MD      Stop Time 1123  -MD      Time Calculation (min) 33 min  -MD      PT Received On 03/13/23  -MD            User Key  (r) = Recorded By, (t) = Taken By, (c) = Cosigned By    Initials Name Provider Type    Sofi Rodriguez PT Physical Therapist              Therapy Charges for Today     Code Description Service Date Service Provider Modifiers Qty    11893418364 HC PT EVAL LOW COMPLEXITY 3 3/13/2023 Sofi Coughlin PT GP 1    [id number]  PT THERAPEUTIC ACT EA 15 MIN 3/13/2023 Sofi Coughlin, PT GP 1        Patient was not wearing a face mask during this therapy encounter. Therapist used appropriate personal protective equipment including mask and gloves.  Mask used was standard procedure mask. Appropriate PPE was worn during the entire therapy session. Hand hygiene was completed before and after therapy session. Patient is not in enhanced droplet precautions.       PT  G-Codes  Outcome Measure Options: AM-PAC 6 Clicks Basic Mobility (PT)  AM-PAC 6 Clicks Score (PT): 20  PT Discharge Summary  Anticipated Discharge Disposition (PT): home with home health, home with assist    Sofi Coughlin, PT  3/13/2023

## 2023-03-13 NOTE — CASE MANAGEMENT/SOCIAL WORK
Continued Stay Note  [facility]     Patient Name: Arely Crow  MRN: [id number]  Today's Date: 3/13/2023    Admit Date: 3/13/2023    Plan: Home with [facility]    Discharge Plan     Plan     Row Name 03/13/23 1019    Plan Home with [facility]                      Discharge Codes    No documentation.               Expected Discharge Date and Time     Expected Discharge Date Expected Discharge Time    Mar 13, 2023             Shannon Epley, RN

## 2023-03-13 NOTE — DISCHARGE INSTRUCTIONS
What to expect after a Nerve Block    Nerve blocks administered to block pain affect many types of nerves, including those nerves that control movement, pain, and normal sensation. Following a nerve block, you may notice some bruising at the site where the block was given. You may experience sensations such as: numbness of the affected area or limb, tingling, heaviness (that is the limb feels heavy to you), weakness or inability to move the affected arm or leg, or a feeling as if your arm or leg has “fallen asleep.”     A nerve block can last from 2 to 36 hours depending on the medications used.  Usually the weakness wears off first followed by the tingling and heaviness. As the block wears off, you may begin to notice pain; however, this sequence of events may occur in any order. Typically, you will be able to move your limb before you will feel it. Once a nerve block begins to wear off, the effects are usually completely gone within 60 minutes.  If you experience continued side effects that you believe are block related for longer than 48 hours, please call your healthcare provider. Please see block-specific instructions below.    Instructions for any Block involving the leg/foot:   If you have had a leg /foot block, you should not bear weight on the affected leg until the block has worn off. After the block has worn off, weight bearing should be as directed by your surgeon. You may be sent home with crutches. You are at high risk for falling because of the anesthetic effects on your leg. Please use caution when standing or trying to move or walk. Have someone assist you until your leg and foot function have returned to normal.     Protection of a “blocked” limb  After a nerve block, you cannot feel pain, pressure, or extremes of temperature in the affected limb. And because of this, your blocked limb is at more risk for injury. For example, it is possible to burn your limb on an extremely hot surface without  feeling it.     When resting, it is important to reposition your limb periodically to avoid prolonged pressure on it. This may require the use of pillows and padding.    While sleeping, you should avoid rolling onto the affected limb or putting too much pressure on it.     If you have a cast or tight dressing, check the color of your fingers or toes of the affected limb. Call your surgeon if they look discolored (that is, dusky, dark colored).    Use caution in cold weather. Cover your limb appropriately to protect it from the cold.    Pain Management:  Your surgeon will give you a prescription for pain medication. Begin taking this before the nerve block wears off. Bear in mind that sometimes the block can wear off in the middle of the night.     ___________________________

## 2023-03-14 ENCOUNTER — HOME CARE VISIT (OUTPATIENT)
Dept: HOME HEALTH SERVICES | Facility: HOME HEALTHCARE | Age: 58
End: 2023-03-14
Payer: COMMERCIAL

## 2023-03-14 VITALS
OXYGEN SATURATION: 98 % | TEMPERATURE: 96.2 F | HEART RATE: 68 BPM | SYSTOLIC BLOOD PRESSURE: 127 MMHG | RESPIRATION RATE: 24 BRPM | DIASTOLIC BLOOD PRESSURE: 70 MMHG

## 2023-03-14 PROCEDURE — G0151 HHCP-SERV OF PT,EA 15 MIN: HCPCS

## 2023-03-14 NOTE — HOME HEALTH
REASON FOR REFERRAL: decreased ability to ambulate in and out of the home following hospitalization s/p right TKR.    MEDICAL HISTORY: Patient reports having right knee problems for 4-5 years which progressively became worse. Has right TKR on 3/13/2023 and was discharged home same day.    SKILLED PHYSICAL THERAPY IS MEDICALLY NECESSARY FOR: remediation of decreased strength, decreased ROM, decreased ambulatory ability, increased risk of falls, decreased transfers, pain/edema    FOCUS OF CARE: therapeutic exercises to improve right LE strength and ROM, gait training to improve ambulatory ability and endurance, transfer training improve ability to perform transfers, fall risk education to decrease risk of falls, pain/edema management to improve comfort

## 2023-03-15 ENCOUNTER — TELEPHONE (OUTPATIENT)
Dept: ORTHOPEDIC SURGERY | Facility: HOSPITAL | Age: 58
End: 2023-03-15
Payer: COMMERCIAL

## 2023-03-15 NOTE — TELEPHONE ENCOUNTER
Post op day 2  Discharge Instructions:  Ask patient about his or her discharge instructions  ?  Patient confirmed understanding   ?  Further instruction needed   What, if any, recommendations, teaching, or interventions did you provide? Click or tap here to enter text.  Health status:  Pain controlled Yes   Does have some increased pain but the pain medication does help to make it tolerable.   Recommended interventions:  Yes  incision/dressing status   ?  Clean without redness, drainage, odor  ?  Redness    ?  Drainage - color Click or tap here to enter text.  ?  Odor  ODALYS - Green light blinking Yes  Difficulties urination No  Last BM 3/13/2023 (if no BM by day 3-recommend OTC suppository or fleets enema)  No BM at this time. started taking the medication, hoping something will happen today.   Medications:  ?Medications reviewed with patient/family/caregiver  Patient taking medications as prescribed?   Yes  If not taking medications as prescribed, note specific medicine(s) and reason for each:  Click or tap here to enter text.  Hospital Follow Up Plan:  Follow up Appointment with Orthopedic surgeon:  ?Has f/u appointment                ?Scheduled f/u appointment  Home Care ordered at discharge?    Yes        Home Care started, or contact made?    Yes   If no, action taken: Click or tap here to enter text.  DME obtained/used in home?         Yes   Using IS  Yes   Other information: Ms. Crow said she is doing ok. PT came out to see her yesterday and they are coming again tomorrow. She is walking and doing the exercises. Dressing remains CDI with the green light blinking. She does have some increased pain, but the pain medication does help. She plans to take a shower today. She feels things are moving in the right direction. Ms. Crow doesn’t have any questions/concerns for me at this time. She has my contact information should she need anything.

## 2023-03-16 ENCOUNTER — HOME CARE VISIT (OUTPATIENT)
Dept: HOME HEALTH SERVICES | Facility: HOME HEALTHCARE | Age: 58
End: 2023-03-16
Payer: COMMERCIAL

## 2023-03-16 VITALS
RESPIRATION RATE: 16 BRPM | SYSTOLIC BLOOD PRESSURE: 118 MMHG | TEMPERATURE: 97.8 F | OXYGEN SATURATION: 98 % | DIASTOLIC BLOOD PRESSURE: 66 MMHG | HEART RATE: 77 BPM

## 2023-03-16 PROCEDURE — G0151 HHCP-SERV OF PT,EA 15 MIN: HCPCS

## 2023-03-16 NOTE — HOME HEALTH
Patient reports no falls or changes in medication since last visit.    Plan for next visit:  -transfer training  -gait training with rolling walker  -education on HEP with progressions as appropriate  -standing balance exercises

## 2023-03-17 ENCOUNTER — HOME CARE VISIT (OUTPATIENT)
Dept: HOME HEALTH SERVICES | Facility: HOME HEALTHCARE | Age: 58
End: 2023-03-17
Payer: COMMERCIAL

## 2023-03-17 VITALS
SYSTOLIC BLOOD PRESSURE: 118 MMHG | OXYGEN SATURATION: 96 % | TEMPERATURE: 97.8 F | HEART RATE: 87 BPM | DIASTOLIC BLOOD PRESSURE: 66 MMHG | RESPIRATION RATE: 16 BRPM

## 2023-03-17 PROCEDURE — G0151 HHCP-SERV OF PT,EA 15 MIN: HCPCS

## 2023-03-20 ENCOUNTER — HOME CARE VISIT (OUTPATIENT)
Dept: HOME HEALTH SERVICES | Facility: HOME HEALTHCARE | Age: 58
End: 2023-03-20
Payer: COMMERCIAL

## 2023-03-20 VITALS
SYSTOLIC BLOOD PRESSURE: 120 MMHG | HEART RATE: 75 BPM | TEMPERATURE: 97.2 F | DIASTOLIC BLOOD PRESSURE: 78 MMHG | OXYGEN SATURATION: 99 % | RESPIRATION RATE: 16 BRPM

## 2023-03-20 PROCEDURE — G0151 HHCP-SERV OF PT,EA 15 MIN: HCPCS

## 2023-03-20 NOTE — HOME HEALTH
Patient reports no falls or changes in medication since last visit.    Plan for next visit:  -transfer training  -gait training with rolling walker/cane as tolerated  -education on HEP with progressions as appropriate  -stair mobility training  -standing balance exercises

## 2023-03-21 ENCOUNTER — HOME CARE VISIT (OUTPATIENT)
Dept: HOME HEALTH SERVICES | Facility: HOME HEALTHCARE | Age: 58
End: 2023-03-21
Payer: COMMERCIAL

## 2023-03-21 VITALS
TEMPERATURE: 97.6 F | SYSTOLIC BLOOD PRESSURE: 124 MMHG | RESPIRATION RATE: 16 BRPM | DIASTOLIC BLOOD PRESSURE: 66 MMHG | OXYGEN SATURATION: 100 % | HEART RATE: 88 BPM

## 2023-03-21 PROCEDURE — G0151 HHCP-SERV OF PT,EA 15 MIN: HCPCS

## 2023-03-21 NOTE — HOME HEALTH
Patient reports no falls or changes in medication since last visit.    Plan for next visit:  -transfer training  -gait training with cane  -education on HEP with progressions as appropriate  -stair mobility training  -standing balance exercises

## 2023-03-24 ENCOUNTER — HOME CARE VISIT (OUTPATIENT)
Dept: HOME HEALTH SERVICES | Facility: HOME HEALTHCARE | Age: 58
End: 2023-03-24
Payer: COMMERCIAL

## 2023-03-24 VITALS
HEART RATE: 92 BPM | TEMPERATURE: 97.7 F | SYSTOLIC BLOOD PRESSURE: 134 MMHG | RESPIRATION RATE: 16 BRPM | DIASTOLIC BLOOD PRESSURE: 72 MMHG | OXYGEN SATURATION: 98 %

## 2023-03-24 PROCEDURE — G0151 HHCP-SERV OF PT,EA 15 MIN: HCPCS

## 2023-03-24 NOTE — HOME HEALTH
Patient reports no falls or changes in medication since last visit.    Plan for next visit:  -transfer training  -gait training with cane  -education on HEP with progressions as appropriate  -stair mobility training  -standing balance exercises  PT discharge next visit.

## 2023-03-25 ENCOUNTER — TELEPHONE (OUTPATIENT)
Dept: ORTHOPEDIC SURGERY | Facility: HOSPITAL | Age: 58
End: 2023-03-25
Payer: COMMERCIAL

## 2023-03-25 NOTE — TELEPHONE ENCOUNTER
Attempted to reach Ms. Crow to see how she is doing as she is 2 weeks SP RTK. Message left at this time.

## 2023-03-27 ENCOUNTER — HOME CARE VISIT (OUTPATIENT)
Dept: HOME HEALTH SERVICES | Facility: HOME HEALTHCARE | Age: 58
End: 2023-03-27
Payer: COMMERCIAL

## 2023-03-27 VITALS
RESPIRATION RATE: 16 BRPM | HEART RATE: 86 BPM | DIASTOLIC BLOOD PRESSURE: 68 MMHG | TEMPERATURE: 97.4 F | OXYGEN SATURATION: 98 % | SYSTOLIC BLOOD PRESSURE: 124 MMHG

## 2023-03-27 PROCEDURE — G0151 HHCP-SERV OF PT,EA 15 MIN: HCPCS

## 2023-03-28 ENCOUNTER — OFFICE VISIT (OUTPATIENT)
Dept: ORTHOPEDIC SURGERY | Facility: CLINIC | Age: 58
End: 2023-03-28
Payer: COMMERCIAL

## 2023-03-28 VITALS — HEIGHT: 66 IN | BODY MASS INDEX: 34.87 KG/M2 | TEMPERATURE: 96.8 F | WEIGHT: 217 LBS

## 2023-03-28 DIAGNOSIS — Z96.651 STATUS POST RIGHT KNEE REPLACEMENT: Primary | ICD-10-CM

## 2023-03-28 PROBLEM — M25.561 BILATERAL KNEE PAIN: Status: ACTIVE | Noted: 2020-04-07

## 2023-03-28 PROBLEM — M25.562 BILATERAL KNEE PAIN: Status: ACTIVE | Noted: 2020-04-07

## 2023-03-28 PROBLEM — R79.89 DECREASED THYROID STIMULATING HORMONE LEVEL: Status: ACTIVE | Noted: 2018-04-04

## 2023-03-28 PROBLEM — R94.6 ABNORMAL FINDING ON THYROID FUNCTION TEST: Status: ACTIVE | Noted: 2018-07-25

## 2023-03-28 PROBLEM — M47.817 LUMBOSACRAL SPONDYLOSIS WITHOUT MYELOPATHY: Status: ACTIVE | Noted: 2021-12-14

## 2023-03-28 PROBLEM — M17.11 PRIMARY OSTEOARTHRITIS OF RIGHT KNEE: Status: ACTIVE | Noted: 2019-04-22

## 2023-03-28 PROBLEM — E78.5 DYSLIPIDEMIA: Status: ACTIVE | Noted: 2020-08-04

## 2023-03-28 PROBLEM — K59.03 DRUG-INDUCED CONSTIPATION: Status: ACTIVE | Noted: 2021-12-03

## 2023-03-28 PROBLEM — M47.816 SPONDYLOSIS OF LUMBAR SPINE: Status: ACTIVE | Noted: 2020-07-11

## 2023-03-28 PROBLEM — F45.42 PAIN DISORDER ASSOCIATED WITH PSYCHOLOGICAL FACTORS: Status: ACTIVE | Noted: 2021-12-14

## 2023-03-28 PROBLEM — M06.9 RHEUMATOID ARTHRITIS: Status: ACTIVE | Noted: 2019-04-22

## 2023-03-28 PROBLEM — Z79.4 ENCOUNTER FOR LONG-TERM (CURRENT) USE OF INSULIN: Status: ACTIVE | Noted: 2021-12-03

## 2023-03-28 PROBLEM — M17.12 PRIMARY OSTEOARTHRITIS OF LEFT KNEE: Status: ACTIVE | Noted: 2019-12-04

## 2023-03-28 PROBLEM — R00.2 PALPITATIONS: Status: ACTIVE | Noted: 2019-08-28

## 2023-03-28 PROBLEM — M25.569 KNEE PAIN: Status: ACTIVE | Noted: 2019-04-22

## 2023-03-28 PROBLEM — G03.9 ARACHNOIDITIS: Status: ACTIVE | Noted: 2020-07-03

## 2023-03-28 PROBLEM — E66.9 OBESITY: Status: ACTIVE | Noted: 2017-09-22

## 2023-03-28 PROBLEM — G25.0 ESSENTIAL TREMOR: Status: ACTIVE | Noted: 2018-03-16

## 2023-03-28 PROCEDURE — 99024 POSTOP FOLLOW-UP VISIT: CPT | Performed by: ORTHOPAEDIC SURGERY

## 2023-03-28 NOTE — PROGRESS NOTES
Arely Crow : 1965 MRN: [id number] DATE: 3/28/2023    DIAGNOSIS: 2 week follow up right total knee      SUBJECTIVE:Patient returns today for 2 week follow up of right total knee replacement. Patient reports doing well with no unusual complaints. Appears to be progressing appropriately.    OBJECTIVE:   Exam:. The incision is healing appropriately. No sign of infection. Range of motion is progressing as expected. The calf is soft and nontender with a negative Homans sign.    ASSESSMENT: 2 week status post right knee replacement.    PLAN: 1) Staples removed and steri strips applied   2) Order given for PT   3) Discontinue JUANA hose   4) Continue ice PRN   5) aspirin 81 mg orally every day for 1 month   6) Follow up in 6 weeks with repeat Xrays of right knee (3views)    Uriel Willoughby MD  3/28/2023

## 2023-03-29 ENCOUNTER — TREATMENT (OUTPATIENT)
Dept: PHYSICAL THERAPY | Facility: CLINIC | Age: 58
End: 2023-03-29
Payer: COMMERCIAL

## 2023-03-29 DIAGNOSIS — Z47.89 ORTHOPEDIC AFTERCARE: ICD-10-CM

## 2023-03-29 DIAGNOSIS — R26.2 DIFFICULTY WALKING: ICD-10-CM

## 2023-03-29 DIAGNOSIS — M25.661 KNEE STIFFNESS, RIGHT: ICD-10-CM

## 2023-03-29 DIAGNOSIS — G03.9 ARACHNOIDITIS: ICD-10-CM

## 2023-03-29 DIAGNOSIS — Z96.651 S/P TOTAL KNEE ARTHROPLASTY, RIGHT: Primary | ICD-10-CM

## 2023-03-29 PROCEDURE — 97162 PT EVAL MOD COMPLEX 30 MIN: CPT | Performed by: PHYSICAL THERAPIST

## 2023-03-29 PROCEDURE — 97110 THERAPEUTIC EXERCISES: CPT | Performed by: PHYSICAL THERAPIST

## 2023-03-29 NOTE — PROGRESS NOTES
[facility]  [facility]  [address] [place] [address]    Initial Evaluation and Plan of Care      Patient: Arely Crow   : 1965  Diagnosis/ICD-10 Code:  S/P total knee arthroplasty, right [Z96.651]  Referring practitioner: RICH Mcclelland  Date of Initial Visit: 3/29/2023  Today's Date: 3/29/2023  Patient seen for 1 session         Visit Diagnoses:    ICD-10-CM ICD-9-CM   1. S/P total knee arthroplasty, right  Z96.651 V43.65   2. Orthopedic aftercare  Z47.89 V54.9   3. Difficulty walking  R26.2 719.7   4. Knee stiffness, right  M25.661 719.56   5. 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 No plans for surgery to her back. Etiology of arachnoiditis unknown.   Did well with same day OP surgery. HH until 2 days ago and last ROM was 0-5-105. Used walker for a week then cane and sometimes can walk without AD.   No plans for TKA on the L.   Taking pain meds as needed.   Ice helping. 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   Mother drives her now to appointments        Patient Occupation: DON Quality of life: good    Pain  Current pain rating: 3  At best pain ratin  At worst pain ratin  Location: lateral knee  Quality: sharp and dull ache  Relieving factors: ice, change in position and medications  Aggravating factors: stairs and ambulation  Progression: improved    Social Support  Lives in: multiple-level home  Lives with: young children    Treatments  Previous treatment: physical therapy and medication  Discharged from (in last 30 days): home health care  Patient Goals  Patient goals for  "therapy: decreased edema, decreased pain, improved balance, increased motion, increased strength, independence with ADLs/IADLs, return to work and return to sport/leisure activities  Patient goal: Walk her dog again which she hasn't done in 3 years.            Objective          Static Posture     Pelvis   Pelvis (Right): Elevated.     Observations     Right Knee   Positive for edema, incision and trophic changes. Negative for drainage.       Tenderness     Right Knee   Tenderness in the lateral joint line.     Neurological Testing     Sensation     Knee   Left Knee   Diminished: light touch   Paresthesia: light touch  Hyposensation: light touch    Active Range of Motion   Left Knee   Flexion: 115 degrees   Extension: 0 degrees     Right Knee   Flexion: 111 degrees with pain  Extension: 0 degrees WFL  Extensor la degrees WFL    Strength/Myotome Testing     Left Knee   Flexion: 4-  Extension: 4-  Quadriceps contraction: fair    Right Knee   Flexion: 4-  Extension: 4-  Quadriceps contraction: good    Swelling     Left Knee Girth Measurement (cm)   Joint line: 45 cm    Right Knee Girth Measurement (cm)   Joint line: 46 cm    Ambulation   Weight-Bearing Status   Weight-Bearing Status (Right): full weight-bearing    Assistive device used: none and single point cane    Ambulation: Level Surfaces   Ambulation with assistive device: independent  Ambulation without assistive device: independent    Observational Gait   Gait: antalgic and asymmetric   Decreased walking speed and stride length.   Right foot contact pattern: foot flat  Base of support: increased    Additional Observational Gait Details  Significant asymmetry from shorter L LE by about 3/4\". Will try 1/8\" lift at home.    Quality of Movement During Gait     Pelvis    Pelvis (Left): Positive Trendelenburg.     Hip    Hip (Right): Positive circumducted.     Additional Quality of Movement During Gait Details  Longer R LE causing some mild circumduction pattern " "         Assessment & Plan     Assessment  Impairments: abnormal gait, abnormal or restricted ROM, activity intolerance, impaired balance, impaired physical strength and lacks appropriate home exercise program  Functional Limitations: carrying objects, walking, pulling, pushing, uncomfortable because of pain and moving in bed  Assessment details: Pt with stable condition happy with her knee outcome so far after 4 years of progressive pain. Steri strips in place and minimal swelling.  Co-morbidity of significant leg length difference being shorter on the left side by around 3/4\" or so. Felt better with build up outside shoe and will try 1/8\" lift in L shoe asap.   Also co-morbidity of arachnoiditis from spine to L LE causing numbness and lack of trust in her L LE for gait and steps. Has not climbed steps besides the 3 to get into her home. Goal is to be able to do this again and feels like her new knee and R LE will get strong enough to do this. Started with 2\" step up today with no issue.   Personal factor of working as DON at a SNF and is needed back on the job soon. This could interfere with gaining functional ROM and power in her knee if she can't continue some rehab after returning to work. Will see if she can access the PT gym at work prn.   Skilled PT needed to help pt navigate thru post op process and gain maximal amount of mobility.     Barriers to therapy: L LE pain and numbness that is intermittent.  Prognosis: good  Prognosis details: Access Code: [email or web address]  URL: [email or web address]  Date: 03/29/2023  Prepared by: Zorre Kimura    Exercises  - Supine Knee Extension Strengthening  - 2 x daily - 7 x weekly - 10 reps - 5s hold  - Seated Hip Adduction Isometrics with Ball  - 2 x daily - 7 x weekly - 10 reps - 5s hold  - Step Up  - 2 x daily - 7 x weekly - 10 reps  - Standing Knee Flexion Stretch on Step  - 2 x daily - 7 x weekly - 3 reps - 30s hold    Goals  Plan Goals: STGs 4 weeks:  1. Improve ROM R " "knee from 0-111 to 0-120  2. Obtain lift to shorter L LE to help with gait pattern  3. Pt to be indep in variety of HEP and cont rehab at her gym at work  4. Pt to be able to ascend and descend 6\" step safely, with one hand rail assist so she can access upper and lower levels of her home  5. LEFS score to improve from 28 to > 40/80  LTGs 12 weeks:  1. Pt to have > 0-125 ROM L knee  2. Strength L LE to improve from 4- to 4+ lifting moderate weights   3. Pt to be able to manage curbs with no AD and no UE assist  4. Pt to return to walking her dog  5. LEFS score > 55/80    Plan  Therapy options: will be seen for skilled therapy services  Planned modality interventions: cryotherapy and electrical stimulation/Russian stimulation  Planned therapy interventions: manual therapy, neuromuscular re-education, soft tissue mobilization, strengthening, stretching, therapeutic activities, home exercise program, gait training, functional ROM exercises, joint mobilization and balance/weight-bearing training  Frequency: 2x week  Duration in weeks: 12  Treatment plan discussed with: patient        History # of Personal Factors and/or Comorbidities: MODERATE (1-2)  Examination of Body System(s): # of elements: LOW (1-2)  Clinical Presentation: STABLE   Clinical Decision Making: MODERATE      Timed:         Manual Therapy:        mins  49912;     Therapeutic Exercise:    30     mins  62096;     Neuromuscular Francine:        mins  99858;    Therapeutic Activity:          mins  34967;     Gait Training:           mins  03790;     Ultrasound:          mins  15419;    Ionto                                   mins   24247  Self Care                            mins   99442  Canalith Repos         mins 80608  Aquatic Exercise                 mins 62600    Un-Timed:  Electrical Stimulation:         mins  89785 ( );  Dry Needling          mins self-pay  Traction          mins 37276    Low Eval          Mins  30622  Mod Eval     25     Mins  " 85272  High Eval                            Mins  56157        Timed Treatment:   30   mins   Total Treatment:     65   mins          PT: Zorre Zeno Kimura, PT     [place] License Number: [id number]  [place] License Number:  [id number]  Electronically signed by Zorre Zeno Kimura, PT, 03/29/23, 11:07 AM EDT    Certification Period: 3/29/2023 thru 6/26/2023  I certify that the therapy services are furnished while this patient is under my care.  The services outlined above are required by this patient, and will be reviewed every 90 days.         Physician Signature:__________________________________________________    PHYSICIAN: Benito Graham APRN  NPI: [id number]                                      DATE:      Please sign and return via fax to [email or web address]  [address] [email or web address] [address]  Thank you, [facility].

## 2023-03-31 ENCOUNTER — TREATMENT (OUTPATIENT)
Dept: PHYSICAL THERAPY | Facility: CLINIC | Age: 58
End: 2023-03-31
Payer: COMMERCIAL

## 2023-03-31 DIAGNOSIS — R26.2 DIFFICULTY WALKING: ICD-10-CM

## 2023-03-31 DIAGNOSIS — Z96.651 S/P TOTAL KNEE ARTHROPLASTY, RIGHT: Primary | ICD-10-CM

## 2023-03-31 DIAGNOSIS — Z47.89 ORTHOPEDIC AFTERCARE: ICD-10-CM

## 2023-03-31 DIAGNOSIS — G03.9 ARACHNOIDITIS: ICD-10-CM

## 2023-03-31 DIAGNOSIS — M25.661 KNEE STIFFNESS, RIGHT: ICD-10-CM

## 2023-03-31 PROCEDURE — 97110 THERAPEUTIC EXERCISES: CPT | Performed by: PHYSICAL THERAPIST

## 2023-03-31 NOTE — PROGRESS NOTES
"Physical Therapy Daily Treatment Note    [facility]  [address] [place] [address]      Patient: Arely Crow   : 1965  Referring practitioner: RICH Mcclelland  Date of Initial Visit: Type: THERAPY  Noted: 3/29/2023  Today's Date: 3/31/2023  Patient seen for 2 sessions       Visit Diagnoses:    ICD-10-CM ICD-9-CM   1. S/P total knee arthroplasty, right  Z96.651 V43.65   2. Orthopedic aftercare  Z47.89 V54.9   3. Difficulty walking  R26.2 719.7   4. Knee stiffness, right  M25.661 719.56   5. 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 Even able to sleep on her side last night. Added 2, \" lifts to her L shoe and feels much better.        Objective   See Exercise, Manual, and Modality Logs for complete treatment.       Assessment & Plan     Assessment    Assessment details: Did well today adding on biking and other ex. Still significant drop in L pelvis but improved with lift. Could possibly benefit from full lift outside shoe for even better resolution of imbalance.    No significant hip weakness seen to cause trendelenburg.   Green tband given for HEP.   Will go back to work on 23.   Careful on WB ex due to her wt, but did well walking today.       Access Code: [email or web address]  URL: [email or web address]  Date: 2023  Prepared by: Zorre Kimura    Exercises  - Supine Knee Extension Strengthening  - 2 x daily - 7 x weekly - 10 reps - 5s hold  - Seated Hip Adduction Isometrics with Ball  - 2 x daily - 7 x weekly - 10 reps - 5s hold  - Step Up  - 2 x daily - 7 x weekly - 10 reps  - Standing Knee Flexion Stretch on Step  - 2 x daily - 7 x weekly - 3 reps - 30s hold  - Sidelying Hip Abduction  - 1 x daily - 7 x weekly - 2 sets - 10 reps  - Seated Hip Abduction with Resistance  - 1 x daily - 7 x weekly - 2 sets - 10 reps  - Hooklying Isometric Hip Abduction Adduction with Belt and Ball  - 1 x daily - 7 " x weekly - 2 sets - 10 reps  - Seated Hamstring Curl with Anchored Resistance  - 1 x daily - 7 x weekly - 2 sets - 10 reps      Timed:         Manual Therapy:        mins  41276;     Therapeutic Exercise:    45     mins  78345;     Neuromuscular Francine:        mins  89473;    Therapeutic Activity:          mins  55721;     Gait Training:           mins  99729;     Ultrasound:          mins  12784;    Ionto                                   mins   67057  Self Care                            mins   81213  Canalith Repos         mins 43566  Work hardening   __   min 48539/77371      Un-Timed:  Electrical Stimulation:         mins  17189 ( );  Dry Needling          mins self-pay  Traction          mins 45806      Timed Treatment:   45   mins   Total Treatment:     55   mins    Zorre Zeno Kimura, PT  [place] License: [id number]    [place] License:  [id number]

## 2023-04-04 ENCOUNTER — TREATMENT (OUTPATIENT)
Dept: PHYSICAL THERAPY | Facility: CLINIC | Age: 58
End: 2023-04-04
Payer: COMMERCIAL

## 2023-04-04 DIAGNOSIS — R26.2 DIFFICULTY WALKING: ICD-10-CM

## 2023-04-04 DIAGNOSIS — Z47.89 ORTHOPEDIC AFTERCARE: ICD-10-CM

## 2023-04-04 DIAGNOSIS — M25.661 KNEE STIFFNESS, RIGHT: ICD-10-CM

## 2023-04-04 DIAGNOSIS — Z96.651 S/P TOTAL KNEE ARTHROPLASTY, RIGHT: Primary | ICD-10-CM

## 2023-04-04 PROCEDURE — 97110 THERAPEUTIC EXERCISES: CPT | Performed by: PHYSICAL THERAPIST

## 2023-04-04 PROCEDURE — 97530 THERAPEUTIC ACTIVITIES: CPT | Performed by: PHYSICAL THERAPIST

## 2023-04-04 PROCEDURE — 97112 NEUROMUSCULAR REEDUCATION: CPT | Performed by: PHYSICAL THERAPIST

## 2023-04-04 NOTE — PROGRESS NOTES
Physical Therapy Daily Treatment Note    Patient: Arely Crow  : 1965  Referring practitioner: RICH Mcclelland  Today's Date: 2023    VISIT#: 3      Subjective   Arely Crow reports: Doing well, knee feeling better, but still gets sore. Has continued to work on exercises at home.      Objective     See Exercise, Manual, and Modality Logs for complete treatment.     Patient Education: continue HEP    Assessment/Plan  Good response to session, still demonstrates compensatory strategies during SLS RLE with lateral trunk lean. Knee flexion ROM still limited but is showing improvement. Good quad contraction but still noted weakness. Ended with ice for pain control.    Progress per Plan of Care            Timed:         Manual Therapy:         mins  77780;     Therapeutic Exercise:    25     mins  68271;     Neuromuscular Francine:  10      mins  43246;    Therapeutic Activity:     10     mins  40844;     Gait Training:           mins  86211;     Ultrasound:          mins  05201;    Ionto:                                   mins   21077  Self Care:                            mins   65582    Un-Timed:  Electrical Stimulation:         mins  08149 ( );  Dry Needling          mins self-pay  Traction          mins 13232  Re-Eval                               mins  79500    Timed Treatment:   45   mins   Total Treatment:     45   mins    Adelita Stroud, PT  Physical Therapist  [place] PT license #: [id number]  [place] PT license #: [id number]

## 2023-04-06 ENCOUNTER — TREATMENT (OUTPATIENT)
Dept: PHYSICAL THERAPY | Facility: CLINIC | Age: 58
End: 2023-04-06
Payer: COMMERCIAL

## 2023-04-06 DIAGNOSIS — Z96.651 S/P TOTAL KNEE ARTHROPLASTY, RIGHT: Primary | ICD-10-CM

## 2023-04-06 DIAGNOSIS — Z47.89 ORTHOPEDIC AFTERCARE: ICD-10-CM

## 2023-04-06 PROCEDURE — 97112 NEUROMUSCULAR REEDUCATION: CPT | Performed by: PHYSICAL THERAPIST

## 2023-04-06 PROCEDURE — 97110 THERAPEUTIC EXERCISES: CPT | Performed by: PHYSICAL THERAPIST

## 2023-04-06 NOTE — PROGRESS NOTES
"Physical Therapy Daily Treatment Note    Patient: Arely Crow  : 1965  Referring practitioner: RICH Mcclelland  Today's Date: 2023    VISIT#: 4      Subjective   Arely Crow reports: Doing well, felt fine after last visit.      Objective     See Exercise, Manual, and Modality Logs for complete treatment.     Patient Education: continue HEP.    Assessment/Plan  Good response to session, progressed knee flexion stretching to prone to include rectus femoris stretching. Improving mechanics with gait without AD, also able to progress step ups to 4\" step, but did require UE assist in order to maintain good form. Ended with ice.    Progress per Plan of Care            Timed:         Manual Therapy:    5     mins  74385;     Therapeutic Exercise:    23     mins  93790;     Neuromuscular Francine:    10    mins  28655;    Therapeutic Activity:          mins  24993;     Gait Training:           mins  19865;     Ultrasound:          mins  75673;    Ionto:                                   mins   67192  Self Care:                            mins   14254    Un-Timed:  Electrical Stimulation:         mins  25752 ( );  Dry Needling          mins self-pay  Traction          mins 84271  Re-Eval                               mins  05689    Timed Treatment:   38   mins   Total Treatment:     38   mins    Adelita Stroud, PT  Physical Therapist  [place] PT license #: [id number]  [place] PT license #: [id number]  "

## 2023-04-11 ENCOUNTER — TREATMENT (OUTPATIENT)
Dept: PHYSICAL THERAPY | Facility: CLINIC | Age: 58
End: 2023-04-11
Payer: COMMERCIAL

## 2023-04-11 DIAGNOSIS — Z47.89 ORTHOPEDIC AFTERCARE: ICD-10-CM

## 2023-04-11 DIAGNOSIS — R26.2 DIFFICULTY WALKING: ICD-10-CM

## 2023-04-11 DIAGNOSIS — Z96.651 S/P TOTAL KNEE ARTHROPLASTY, RIGHT: Primary | ICD-10-CM

## 2023-04-11 PROCEDURE — 97530 THERAPEUTIC ACTIVITIES: CPT | Performed by: PHYSICAL THERAPIST

## 2023-04-11 PROCEDURE — 97112 NEUROMUSCULAR REEDUCATION: CPT | Performed by: PHYSICAL THERAPIST

## 2023-04-11 PROCEDURE — 97110 THERAPEUTIC EXERCISES: CPT | Performed by: PHYSICAL THERAPIST

## 2023-04-11 NOTE — PROGRESS NOTES
Physical Therapy Daily Treatment Note    Patient: Arely Crow  : 1965  Referring practitioner: RICH Mcclelland  Today's Date: 2023    VISIT#: 5      Subjective   Arely Crow reports: Doing well, knee still sore on the inside, but feels that she is getting better.       Objective     See Exercise, Manual, and Modality Logs for complete treatment.     Patient Education: continue HEP.     Assessment/Plan  Good response to session, improving ROM and strength. Still quite impaired gait mechanics and continues to have functional LE weakness as evidenced by difficulty with sit to stand exercise without UE assist.     Progress per Plan of Care            Timed:         Manual Therapy:         mins  51803;     Therapeutic Exercise:    23     mins  73177;     Neuromuscular Francine:     10   mins  14329;    Therapeutic Activity:     10     mins  96033;     Gait Training:           mins  05163;     Ultrasound:          mins  16316;    Ionto:                                   mins   88470  Self Care:                            mins   56188    Un-Timed:  Electrical Stimulation:         mins  47902 ( );  Dry Needling          mins self-pay  Traction          mins 74135  Re-Eval                               mins  17657    Timed Treatment:   43   mins   Total Treatment:     43   mins    Adelita Stroud, PT  Physical Therapist  [place] PT license #: [id number]  [place] PT license #: [id number]

## 2023-04-13 ENCOUNTER — TREATMENT (OUTPATIENT)
Dept: PHYSICAL THERAPY | Facility: CLINIC | Age: 58
End: 2023-04-13
Payer: COMMERCIAL

## 2023-04-13 DIAGNOSIS — Z96.651 S/P TOTAL KNEE ARTHROPLASTY, RIGHT: Primary | ICD-10-CM

## 2023-04-13 DIAGNOSIS — M25.661 KNEE STIFFNESS, RIGHT: ICD-10-CM

## 2023-04-13 DIAGNOSIS — Z47.89 ORTHOPEDIC AFTERCARE: ICD-10-CM

## 2023-04-13 DIAGNOSIS — R26.2 DIFFICULTY WALKING: ICD-10-CM

## 2023-04-13 PROCEDURE — 97530 THERAPEUTIC ACTIVITIES: CPT | Performed by: PHYSICAL THERAPIST

## 2023-04-13 PROCEDURE — 97110 THERAPEUTIC EXERCISES: CPT | Performed by: PHYSICAL THERAPIST

## 2023-04-13 PROCEDURE — 97112 NEUROMUSCULAR REEDUCATION: CPT | Performed by: PHYSICAL THERAPIST

## 2023-04-13 NOTE — PROGRESS NOTES
Physical Therapy Daily Treatment Note    Patient: Arely Crow  : 1965  Referring practitioner: RICH Mcclelland  Today's Date: 2023    VISIT#: 6      Subjective   Arely Crow reports: Doing well, nothing new to report. Feeling a little lightheaded or woosey today.      Objective     BP post exercise: 136/80    Knee flexion PROM: 114 degrees    See Exercise, Manual, and Modality Logs for complete treatment.     Patient Education: continue HEP    Assessment/Plan  Good response to session, took it a little easy due to reports of light headedness, did not increase during session. Continuing to demonstrate improved knee flexion ROM.     Progress per Plan of Care            Timed:         Manual Therapy:         mins  29213;     Therapeutic Exercise:    25     mins  90077;     Neuromuscular Francine:    10    mins  03235;    Therapeutic Activity:     10     mins  04746;     Gait Training:           mins  38883;     Ultrasound:          mins  67683;    Ionto:                                   mins   54159  Self Care:                            mins   31940    Un-Timed:  Electrical Stimulation:         mins  40510 ( );  Dry Needling          mins self-pay  Traction          mins 15224  Re-Eval                               mins  52177    Timed Treatment:   45   mins   Total Treatment:     45   mins    Adelita Stroud, PT  Physical Therapist  [place] [facility] license #: [id number]  [place] [facility] license #: [id number]

## 2023-04-18 ENCOUNTER — TREATMENT (OUTPATIENT)
Dept: PHYSICAL THERAPY | Facility: CLINIC | Age: 58
End: 2023-04-18
Payer: COMMERCIAL

## 2023-04-18 DIAGNOSIS — Z47.89 ORTHOPEDIC AFTERCARE: ICD-10-CM

## 2023-04-18 DIAGNOSIS — Z96.651 S/P TOTAL KNEE ARTHROPLASTY, RIGHT: Primary | ICD-10-CM

## 2023-04-18 DIAGNOSIS — R26.2 DIFFICULTY WALKING: ICD-10-CM

## 2023-04-18 NOTE — PROGRESS NOTES
Physical Therapy Daily Treatment Note    Patient: Arely Crow  : 1965  Referring practitioner: RICH Mcclelland  Today's Date: 2023    VISIT#: 7      Subjective   Arely Crow reports: Doing well, went back to work yesterday, was tired but not too sore.       Objective     See Exercise, Manual, and Modality Logs for complete treatment.     Patient Education: continue HEP    Assessment/Plan  Good response to session, did have one instance of reporting some slight lightheadedness but that resolved quickly and no other incidents. Showing excellent progress with ROM and functional strength.    Progress per Plan of Care            Timed:         Manual Therapy:         mins  82359;     Therapeutic Exercise:    23     mins  73639;     Neuromuscular Francine:    10    mins  48098;    Therapeutic Activity:     10     mins  73181;     Gait Training:           mins  60879;     Ultrasound:          mins  78418;    Ionto:                                   mins   25114  Self Care:                            mins   24996    Un-Timed:  Electrical Stimulation:         mins  70040 ( );  Dry Needling          mins self-pay  Traction          mins 04772  Re-Eval                               mins  61963    Timed Treatment:   43   mins   Total Treatment:     43   mins    Adelita Stroud, PT  Physical Therapist  [place] [facility] license #: [id number]  [place] [facility] license #: [id number]

## 2023-04-21 ENCOUNTER — TREATMENT (OUTPATIENT)
Dept: PHYSICAL THERAPY | Facility: CLINIC | Age: 58
End: 2023-04-21
Payer: COMMERCIAL

## 2023-04-21 DIAGNOSIS — R26.2 DIFFICULTY WALKING: ICD-10-CM

## 2023-04-21 DIAGNOSIS — Z96.651 S/P TOTAL KNEE ARTHROPLASTY, RIGHT: Primary | ICD-10-CM

## 2023-04-21 DIAGNOSIS — Z47.89 ORTHOPEDIC AFTERCARE: ICD-10-CM

## 2023-04-21 DIAGNOSIS — M25.661 KNEE STIFFNESS, RIGHT: ICD-10-CM

## 2023-04-21 NOTE — PROGRESS NOTES
Physical Therapy Daily Treatment Note    Patient: Arely Crow  : 1965  Referring practitioner: RICH Mcclelland  Today's Date: 2023    VISIT#: 8      Subjective   Arely Crow reports: Doing well, worked all week, felt tired but pretty good.       Objective     See Exercise, Manual, and Modality Logs for complete treatment.     Patient Education: continue HEP.    Assessment/Plan  Good response to session, no adverse effects. Progressed standing exercises again today working on more stability and strengthening. No significant increased pain. She is compliant with her HEP at home.     Progress per Plan of Care            Timed:         Manual Therapy:         mins  37000;     Therapeutic Exercise:    25     mins  37893;     Neuromuscular Francine:    10    mins  70360;    Therapeutic Activity:     10     mins  65149;     Gait Training:           mins  66854;     Ultrasound:          mins  92807;    Ionto:                                   mins   48404  Self Care:                            mins   10836    Un-Timed:  Electrical Stimulation:         mins  65724 (MC );  Dry Needling          mins self-pay  Traction          mins 02234  Re-Eval                               mins  18945    Timed Treatment:   45   mins   Total Treatment:     45   mins    Adelita Stroud, PT  Physical Therapist  [place] [facility] license #: [id number]  [place] [facility] license #: [id number]

## 2023-04-25 ENCOUNTER — TREATMENT (OUTPATIENT)
Dept: PHYSICAL THERAPY | Facility: CLINIC | Age: 58
End: 2023-04-25
Payer: COMMERCIAL

## 2023-04-25 DIAGNOSIS — Z96.651 S/P TOTAL KNEE ARTHROPLASTY, RIGHT: Primary | ICD-10-CM

## 2023-04-25 DIAGNOSIS — Z47.89 ORTHOPEDIC AFTERCARE: ICD-10-CM

## 2023-04-25 DIAGNOSIS — M25.661 KNEE STIFFNESS, RIGHT: ICD-10-CM

## 2023-04-25 DIAGNOSIS — R26.2 DIFFICULTY WALKING: ICD-10-CM

## 2023-04-25 NOTE — PROGRESS NOTES
Physical Therapy Daily Treatment Note    Patient: Arely Crow  : 1965  Referring practitioner: RICH Mcclelland  Today's Date: 2023    VISIT#: 9      Subjective   Arely Crow reports: Doing well, but left knee has been hurting, wondering if it the heel lift.       Objective     See Exercise, Manual, and Modality Logs for complete treatment.     Patient Education: consider trying some time without heel lift. Per my assessment today her leg length was not that different, <1 cm.     Assessment/Plan  Good response to session, but was having some increased left knee pain. Otherwise good response to session. Has altered gait mechanics, but this appears to be due to underlying low back issues and LLE weakness.    Progress per Plan of Care            Timed:         Manual Therapy:         mins  94839;     Therapeutic Exercise:    30     mins  25164;     Neuromuscular Francine:    15    mins  86820;    Therapeutic Activity:     10     mins  27137;     Gait Training:           mins  13848;     Ultrasound:          mins  45964;    Ionto:                                   mins   43192  Self Care:                            mins   57068    Un-Timed:  Electrical Stimulation:         mins  35407 ( );  Dry Needling          mins self-pay  Traction          mins 35984  Re-Eval                               mins  17280    Timed Treatment:   55   mins   Total Treatment:     55   mins    Adelita Stroud, PT  Physical Therapist  [place] [facility] license #: [id number]  [place] [facility] license #: [id number]

## 2023-04-27 ENCOUNTER — TREATMENT (OUTPATIENT)
Dept: PHYSICAL THERAPY | Facility: CLINIC | Age: 58
End: 2023-04-27
Payer: COMMERCIAL

## 2023-04-27 DIAGNOSIS — R26.2 DIFFICULTY WALKING: ICD-10-CM

## 2023-04-27 DIAGNOSIS — Z96.651 S/P TOTAL KNEE ARTHROPLASTY, RIGHT: Primary | ICD-10-CM

## 2023-04-27 DIAGNOSIS — Z47.89 ORTHOPEDIC AFTERCARE: ICD-10-CM

## 2023-04-27 NOTE — PROGRESS NOTES
"Physical Therapy Progress Note/Re-assessment    Patient: Arely Crow  : 1965  Referring practitioner: RICH Mcclelland  Today's Date: 2023    VISIT#: 10    Subjective   Arely Crow reports: Doing well, feeling much better. Walking distance is just slightly limited, having a little trouble on stairs. Not having much pain, just a little soreness.      Objective     Right Knee ROM:  Flexion: 115 degrees with pain  Extension: 0 degrees WFL  Extensor la degrees WFL    Strength/Myotome Testing     Left Knee   Flexion: 4  Extension: 4  Quadriceps contraction: fair    Right Knee   Flexion: 4  Extension: 4  Quadriceps contraction: good    See Exercise, Manual, and Modality Logs for complete treatment.     Patient Education: continue HEP    Assessment & Plan     Assessment    Assessment details: Arely is making excellent progress with therapy. We are seeing improvements in ROM, strength and improving gait mechanics. The patient continues to demonstrate limitations in end range flexion ROM, balance and higher level strengthening. She has met 3/5 STGs and making good progress toward remaining goals. Requires continued PT to address remaining deficits and return to PLOF.     Goals  Plan Goals: STGs 4 weeks:  1. Improve ROM R knee from 0-111 to 0-120. PROGRESSING  2. Obtain lift to shorter L LE to help with gait pattern. MET  3. Pt to be indep in variety of HEP and cont rehab at her gym at work. MET  4. Pt to be able to ascend and descend 6\" step safely, with one hand rail assist so she can access upper and lower levels of her home. MET  5. LEFS score to improve from 28 to > 40/80. NT    LTGs 12 weeks: - PROGRESSING  1. Pt to have > 0-125 ROM L knee.  2. Strength L LE to improve from 4- to 4+ lifting moderate weights   3. Pt to be able to manage curbs with no AD and no UE assist  4. Pt to return to walking her dog  5. LEFS score > 55/80      Plan  Therapy options: will be seen for skilled therapy " services      Progress per Plan of Care            Timed:         Manual Therapy:         mins  06733;     Therapeutic Exercise:    25     mins  25624;     Neuromuscular Francine:    10    mins  47748;    Therapeutic Activity:     10     mins  05402;     Gait Training:           mins  07826;     Ultrasound:          mins  46289;    Ionto:                                   mins   40573  Self Care:                            mins   01801    Un-Timed:  Electrical Stimulation:         mins  38833 ( );  Dry Needling          mins self-pay  Traction          mins 08156  Re-Eval                               mins  57471    Timed Treatment:   45   mins   Total Treatment:     45   mins    Adelita Stroud, PT  Physical Therapist  [place] [facility] license #: [id number]  [place] [facility] license #: [id number]

## 2023-05-10 ENCOUNTER — TREATMENT (OUTPATIENT)
Dept: PHYSICAL THERAPY | Facility: CLINIC | Age: 58
End: 2023-05-10
Payer: COMMERCIAL

## 2023-05-10 DIAGNOSIS — Z96.651 S/P TOTAL KNEE ARTHROPLASTY, RIGHT: Primary | ICD-10-CM

## 2023-05-10 DIAGNOSIS — Z47.89 ORTHOPEDIC AFTERCARE: ICD-10-CM

## 2023-05-10 DIAGNOSIS — R26.2 DIFFICULTY WALKING: ICD-10-CM

## 2023-05-10 NOTE — PROGRESS NOTES
Physical Therapy Daily Treatment Note    Patient: Arely Crow  : 1965  Referring practitioner: RICH Mcclelland  Today's Date: 5/10/2023    VISIT#: 11      Subjective   Arely Crow reports: Doing well, was so busy with work last week she wasn't really able to do her exercises. Stopped wearing the heel lift because it was hurting her left knee.       Objective     Knee flexion AROM: 102 deg (at beginning of session)  Knee flexion PROM: 110 deg (after stretching)    See Exercise, Manual, and Modality Logs for complete treatment.     Patient Education: continue HEP more regularly    Assessment/Plan  Arely is doing well in terms of her pain level and function. However after taking 1 week off therapy and her HEP her knee flexion ROM has tightened up. Reinforced importance of performing exercises regularly.     Progress per Plan of Care; continue 2x/week.             Timed:         Manual Therapy:         mins  94532;     Therapeutic Exercise:    23     mins  83741;     Neuromuscular Francine:    10    mins  31088;    Therapeutic Activity:     10     mins  65579;     Gait Training:           mins  09183;     Ultrasound:          mins  28502;    Ionto:                                   mins   54572  Self Care:                            mins   43270    Un-Timed:  Electrical Stimulation:         mins  17691 ( );  Dry Needling          mins self-pay  Traction          mins 11176  Re-Eval                               mins  77283    Timed Treatment:   43   mins   Total Treatment:     43   mins    Adelita Stroud, PT  Physical Therapist  [place] [facility] license #: [id number]  [place] [facility] license #: [id number]

## 2023-05-11 ENCOUNTER — OFFICE VISIT (OUTPATIENT)
Dept: ORTHOPEDIC SURGERY | Facility: CLINIC | Age: 58
End: 2023-05-11
Payer: COMMERCIAL

## 2023-05-11 VITALS — BODY MASS INDEX: 34.72 KG/M2 | TEMPERATURE: 97.7 F | HEIGHT: 66 IN | WEIGHT: 216 LBS

## 2023-05-11 DIAGNOSIS — Z96.651 STATUS POST RIGHT KNEE REPLACEMENT: ICD-10-CM

## 2023-05-11 DIAGNOSIS — R52 PAIN: Primary | ICD-10-CM

## 2023-05-11 RX ORDER — ETODOLAC 500 MG/1
500 TABLET, FILM COATED ORAL 2 TIMES DAILY
COMMUNITY

## 2023-05-11 NOTE — PROGRESS NOTES
Arely Crow : 1965 MRN: [id number] DATE: 2023    DIAGNOSIS: 8 week follow up right total knee      SUBJECTIVE:Patient returns today for 8 week follow up of right total knee replacement. Patient reports doing well with no unusual complaints. Appears to be progressing appropriately.  Reports that her pain level is very minimal at this time.  She is still going to physical therapy with [facility] and progressing well.  She denies any signs or symptoms of infection, and she is without any other significant complaints today.    OBJECTIVE:   Exam:. The incision is well healed. No sign of infection. Range of motion is measured at 0 to 120. The calf is soft and nontender with a negative Homans sign. Strength is progressing and the patient is ambulating appropriately.    DIAGNOSTIC STUDIES  Xrays: 3 views of the right knee (AP, lateral, and sunrise) were ordered and reviewed for evaluation of recent knee replacement. They demonstrate a well positioned, well aligned knee replacement without complicating factors noted. In comparison with previous films there has been no change.    ASSESSMENT: 8 week status post right knee replacement.    PLAN: 1) Continue with PT exercises as prescribed   2) Follow up in 10 months for annual visit    RICH Mcclelland  2023

## 2023-05-18 NOTE — PROGRESS NOTES
"Physical Therapy Daily Treatment Note    Visit Diagnoses:    ICD-10-CM ICD-9-CM   1. S/P total knee arthroplasty, right  Z96.651 V43.65   2. Orthopedic aftercare  Z47.89 V54.9   3. Knee stiffness, right  M25.661 719.56       VISIT#: 12      Arely Crow reports: Her R knee is doing really great. She does not have any pain except occasionally at night it might go up to a 2-3/10. She believes functionally she can do most everything she wants to do. She is having more pain and issues with her L LE now and she thinks it is trying to adjust after the surgery on her R knee. She tried a heel lift on the L and stopped using it because it was not recommended. She is able to go up/down stairs in her home with handrail. She has no gym at work and has not returned to one.   Current Pain Level:    0/10; Worst:   2-3/10;  Affected Area: R TKR    Functional Deficits/Irritating Factors: squatting  Progression: improving  Compliance with HEP Reported: Yes    Objective  Presents: Trendelenburg gait to R, shorter stride  Increased sets/reps of:  none   Increased resistance on:  none  Added to Program: 8\" step on R,  LLE up on step to prevent pain in it during mini squats  AROM R knee extension = 0 deg; Flexion = 115 deg      See Exercise, Manual, and Modality Logs for complete treatment.     Patient Education: Pt was educated on exercise biomechanical correctness, intensity, and speed.     Assessment:  Reviewed goals with patient and she is still working on her STGs (has met #2, 4). She improved her knee flexion range of motion to 115 deg today. Perfect knee extension. Pt will continue to benefit from skilled PT interventions to address current functional deficits and impairments.       Plan: Progress to/Continue with current program.       Timed:  Manual Therapy:           10_    mins  63505;  Ultrasound:                     0      mins  02719;   Therapeutic Exercise:    20     mins  20785;     Neuromuscular Francine:   0     mins  " 56118;    Therapeutic Activity:      0     mins  19154;      Iontophoresis              _0__   mins  Dry Needling               _0____   mins         Untimed:  Work Conditioning: __0__ mins 40777  Electrical Stimulation:       mins  14859 ( );  Mechanical Traction:       0        mins  14204;   Paraffin                       __0___  mins   Ice/Heat: __0__ mins      Timed Treatment:   30   mins   Total Treatment:     30   mins          Keena Chávez PTA  [place] License # [id number]  Physical Therapist Assistant

## 2023-05-19 ENCOUNTER — TREATMENT (OUTPATIENT)
Dept: PHYSICAL THERAPY | Facility: CLINIC | Age: 58
End: 2023-05-19
Payer: COMMERCIAL

## 2023-05-19 DIAGNOSIS — Z96.651 S/P TOTAL KNEE ARTHROPLASTY, RIGHT: Primary | ICD-10-CM

## 2023-05-19 DIAGNOSIS — M25.661 KNEE STIFFNESS, RIGHT: ICD-10-CM

## 2023-05-19 DIAGNOSIS — Z47.89 ORTHOPEDIC AFTERCARE: ICD-10-CM

## 2023-05-22 ENCOUNTER — TREATMENT (OUTPATIENT)
Dept: PHYSICAL THERAPY | Facility: CLINIC | Age: 58
End: 2023-05-22
Payer: COMMERCIAL

## 2023-05-22 DIAGNOSIS — Z96.651 S/P TOTAL KNEE ARTHROPLASTY, RIGHT: Primary | ICD-10-CM

## 2023-05-22 DIAGNOSIS — M25.661 KNEE STIFFNESS, RIGHT: ICD-10-CM

## 2023-05-22 DIAGNOSIS — Z47.89 ORTHOPEDIC AFTERCARE: ICD-10-CM

## 2023-05-22 NOTE — PROGRESS NOTES
Physical Therapy Daily Treatment Note    Patient: Arely Crow  : 1965  Referring practitioner: RICH Mcclelland  Today's Date: 2023    VISIT#: 13      Subjective   Arely Crow reports: Doing ok, right knee was pretty sore after last time, still a little sore today. Left knee has still really been bothering her the last few days.      Objective     See Exercise, Manual, and Modality Logs for complete treatment.     Patient Education: continue HEP, take it easy on left leg, ice regularly and do some basic stretching.     Assessment/Plan   Fair response to session, right doing very well, however activity somewhat limited by left knee pain. Improving right knee ROM and functional strength.     Progress per Plan of Care            Timed:         Manual Therapy:         mins  66081;     Therapeutic Exercise:    23     mins  82245;     Neuromuscular Francine:        mins  83003;    Therapeutic Activity:     10     mins  48755;     Gait Training:           mins  93541;     Ultrasound:          mins  89589;    Ionto:                                   mins   90940  Self Care:                            mins   89765    Un-Timed:  Electrical Stimulation:         mins  59650 ( );  Dry Needling          mins self-pay  Traction          mins 89756  Re-Eval                               mins  00615    Timed Treatment:   33   mins   Total Treatment:     33   mins    Adelita Stroud, PT  Physical Therapist  [place] [facility] license #: [id number]  [place] [facility] license #: [id number]

## 2023-05-25 ENCOUNTER — TREATMENT (OUTPATIENT)
Dept: PHYSICAL THERAPY | Facility: CLINIC | Age: 58
End: 2023-05-25
Payer: COMMERCIAL

## 2023-05-25 DIAGNOSIS — M25.661 KNEE STIFFNESS, RIGHT: ICD-10-CM

## 2023-05-25 DIAGNOSIS — Z96.651 S/P TOTAL KNEE ARTHROPLASTY, RIGHT: Primary | ICD-10-CM

## 2023-05-25 DIAGNOSIS — R26.2 DIFFICULTY WALKING: ICD-10-CM

## 2023-05-25 DIAGNOSIS — Z47.89 ORTHOPEDIC AFTERCARE: ICD-10-CM

## 2023-05-25 NOTE — PROGRESS NOTES
"Physical Therapy Progress Note    Patient: Arely Crow  : 1965  Referring practitioner: RICH Mcclelland  Today's Date: 2023    VISIT#: 14      Subjective   Arely Crow reports: Doing well, right knee feeling good, left knee still bothering her.       Objective     Right Knee ROM:  Flexion: 119 degrees with pain  Extension: 0 degrees WFL  Extensor la degrees WFL     Strength/Myotome Testing      Left Knee   Flexion: 4  Extension: 4  Quadriceps contraction: fair     Right Knee   Flexion: 4+  Extension: 4+  Quadriceps contraction: good    See Exercise, Manual, and Modality Logs for complete treatment.     Patient Education: continue HEP.     Assessment & Plan     Assessment    Assessment details: Arely is making excellent progress with therapy. She had a setback 2 weeks ago due to working a lot and missing therapy sessions. However she has now made up for that loss. We are seeing improvements in ROM and strength. The patient continues to demonstrate limitations in end range motion and functional strength. She has met 4/5 STGs and 1 LTG and making good progress toward remaining goals. Requires continued PT to address remaining deficits and return to PLOF.     Goals  Plan Goals: STGs 4 weeks:  1. Improve ROM R knee from 0-111 to 0-120. PROGRESSING (119 deg)  2. Obtain lift to shorter L LE to help with gait pattern. MET  3. Pt to be indep in variety of HEP and cont rehab at her gym at work. MET  4. Pt to be able to ascend and descend 6\" step safely, with one hand rail assist so she can access upper and lower levels of her home. MET  5. LEFS score to improve from 28 to > 40/80. MET (49/80)    LTGs 12 weeks: -   1. Pt to have > 0-125 ROM L knee. PROGRESSING  2. Strength L LE to improve from 4- to 4+ lifting moderate weights PROGRESSING (50)  3. Pt to be able to manage curbs with no AD and no UE assist. MET  4. Pt to return to walking her dog. PROGRESSING  5. LEFS score > 55/80. " PROGRESSING    Plan  Therapy options: will be seen for skilled therapy services  Plan details: Continue 2x/week next week, then likely drop to 1x/wk x 2 weeks          Progress per Plan of Care            Timed:         Manual Therapy:         mins  93642;     Therapeutic Exercise:    20     mins  29659;     Neuromuscular Francine:    10    mins  81023;    Therapeutic Activity:     15     mins  03006;     Gait Training:           mins  35759;     Ultrasound:          mins  90742;    Ionto:                                   mins   19701  Self Care:                            mins   74099    Un-Timed:  Electrical Stimulation:         mins  82173 ( );  Dry Needling          mins self-pay  Traction          mins 14004  Re-Eval                               mins  87038    Timed Treatment:   45   mins   Total Treatment:     45   mins    Adelita Stroud, PT  Physical Therapist  [place] [facility] license #: [id number]  [place] [facility] license #: [id number]

## 2023-05-31 ENCOUNTER — TELEPHONE (OUTPATIENT)
Dept: PHYSICAL THERAPY | Facility: OTHER | Age: 58
End: 2023-05-31

## 2023-05-31 NOTE — TELEPHONE ENCOUNTER
Caller: Arely Crow    Relationship: Self    What was the call regarding: PATIENT CANCELLED APPT TODAY BECAUSE THEY CANT MAKE IT

## 2023-06-02 ENCOUNTER — TREATMENT (OUTPATIENT)
Dept: PHYSICAL THERAPY | Facility: CLINIC | Age: 58
End: 2023-06-02

## 2023-06-02 DIAGNOSIS — Z47.89 ORTHOPEDIC AFTERCARE: ICD-10-CM

## 2023-06-02 DIAGNOSIS — M25.661 KNEE STIFFNESS, RIGHT: ICD-10-CM

## 2023-06-02 DIAGNOSIS — Z96.651 S/P TOTAL KNEE ARTHROPLASTY, RIGHT: Primary | ICD-10-CM

## 2023-06-02 DIAGNOSIS — R26.2 DIFFICULTY WALKING: ICD-10-CM

## 2023-06-02 NOTE — PROGRESS NOTES
Physical Therapy Daily Treatment Note    Visit Diagnoses:    ICD-10-CM ICD-9-CM   1. S/P total knee arthroplasty, right  Z96.651 V43.65   2. Knee stiffness, right  M25.661 719.56   3. Difficulty walking  R26.2 719.7   4. Orthopedic aftercare  Z47.89 V54.9       VISIT#: 15      Arely Crow reports: Her R knee feels great. She is doing a lot of exercises at home.   Current Pain Level:    0/10; Worst:   2-3/10 with prolonged walking/standing  Affected Area: R TKR     Functional Deficits/Irritating Factors: squatting  Progression: improving  Compliance with HEP Reported: Yes     Objective  Presents: Trendelenburg gait to R, shorter stride  Increased sets/reps of:  HS curls   Increased resistance on:  HS curls  Added to Program: chest press with MB with sit<>stands        See Exercise, Manual, and Modality Logs for complete treatment.     Patient Education: Pt was educated on exercise biomechanical correctness, intensity, and speed.     Assessment:  Pt doing very well and needs minimal cuing for her exercises. Demonstrates good technique and understanding of her exercises. She is working towards completing her LTGs. Needs continues improvements in range of motion and functional strength.  Pt will continue to benefit from skilled PT interventions to address current functional deficits and impairments.       Plan: Progress to/Continue with current program.       Timed:  Manual Therapy:            0_    mins  17153;  Ultrasound:                     0      mins  72554;   Therapeutic Exercise:    15     mins  91519;     Neuromuscular Francine:   0     mins  83845;    Therapeutic Activity:      15     mins  22504;      Iontophoresis              _0__   mins  Dry Needling               _0____   mins         Untimed:  Work Conditioning: __0__ mins 81644  Electrical Stimulation:    0    mins  85180 ( );  Mechanical Traction:       0        mins  94551;   Paraffin                       __0___  mins   Ice/Heat: __0__  mins      Timed Treatment:   30   mins   Total Treatment:     30   mins          Keena Chávez, PTA  [place] License # [id number]  Physical Therapist Assistant

## 2023-06-08 ENCOUNTER — TREATMENT (OUTPATIENT)
Dept: PHYSICAL THERAPY | Facility: CLINIC | Age: 58
End: 2023-06-08
Payer: COMMERCIAL

## 2023-06-08 DIAGNOSIS — R26.2 DIFFICULTY WALKING: ICD-10-CM

## 2023-06-08 DIAGNOSIS — Z96.651 S/P TOTAL KNEE ARTHROPLASTY, RIGHT: Primary | ICD-10-CM

## 2023-06-08 DIAGNOSIS — M25.661 KNEE STIFFNESS, RIGHT: ICD-10-CM

## 2023-06-08 NOTE — PROGRESS NOTES
"Physical Therapy Daily Treatment Note/Discharge Summary    Patient: Arely Crow  : 1965  Referring practitioner: RICH Mcclelland  Today's Date: 2023    VISIT#: 16      Subjective   Arely Crow reports: Doing great, feels she is ready for today to be her last day.       Objective   AROM knee flexion (before stretching): 113 deg  PROM knee flexion (after stretching): 122 deg    See Exercise, Manual, and Modality Logs for complete treatment.     Patient Education:   HEP:   Access Code: [email or web address]  URL: [email or web address]  Date: 2023  Prepared by: Adelita Stroud    Exercises  - Supine Heel Slide with Strap  - 1 x daily - 7 x weekly - 2 sets - 10 reps - 5 sec hold  - Prone Quadriceps Stretch with Strap  - 1 x daily - 7 x weekly - 3 reps - 30 sec hold  - Gastroc Stretch on Wall  - 1 x daily - 7 x weekly - 3 reps - 30 second hold  - Sit to Stand  - 1 x daily - 7 x weekly - 2 sets - 10 reps  - Side Stepping with Resistance at Thighs  - 1 x daily - 7 x weekly - 1 sets - 10 reps  - Standing Hamstring Curl with Resistance  - 1 x daily - 7 x weekly - 3 sets - 10 reps  - Forward Step Up  - 1 x daily - 7 x weekly - 3 sets - 10 reps    Assessment & Plan     Assessment    Assessment details: Arely has made excellent progress with therapy, she has adequate strength and ROM to continue HEP on her own for strengthening and flexibility. Progressed and finalized HEP today.     Goals  Plan Goals: STGs 4 weeks:  1. Improve ROM R knee from 0-111 to 0-120. MET   2. Obtain lift to shorter L LE to help with gait pattern. MET  3. Pt to be indep in variety of HEP and cont rehab at her gym at work. MET  4. Pt to be able to ascend and descend 6\" step safely, with one hand rail assist so she can access upper and lower levels of her home. MET  5. LEFS score to improve from 28 to > 40/80. MET (49/80)    LTGs 12 weeks: -   1. Pt to have > 0-125 ROM L knee. PROGRESSING  2. Strength L LE to improve from 4- to 4+ lifting " moderate weights MET   3. Pt to be able to manage curbs with no AD and no UE assist. MET  4. Pt to return to walking her dog. MET  5. LEFS score > 55/80. PROGRESSING       Plan  Plan details: Discharge with HEP              Timed:         Manual Therapy:         mins  52425;     Therapeutic Exercise:    20     mins  12416;     Neuromuscular Francine:        mins  24138;    Therapeutic Activity:     10     mins  66626;     Gait Training:           mins  73382;     Ultrasound:          mins  11933;    Ionto:                                   mins   62131  Self Care:                            mins   35191    Un-Timed:  Electrical Stimulation:         mins  55340 ( );  Dry Needling          mins self-pay  Traction          mins 69991  Re-Eval                               mins  77878    Timed Treatment:   30   mins   Total Treatment:     30   mins    Adelita Stroud, PT  Physical Therapist  [place] [facility] license #: [id number]  [place] [facility] license #: [id number]

## 2024-03-12 ENCOUNTER — OFFICE VISIT (OUTPATIENT)
Dept: ORTHOPEDIC SURGERY | Facility: CLINIC | Age: 59
End: 2024-03-12
Payer: COMMERCIAL

## 2024-03-12 VITALS — WEIGHT: 241 LBS | TEMPERATURE: 98.6 F | BODY MASS INDEX: 38.73 KG/M2 | HEIGHT: 66 IN

## 2024-03-12 DIAGNOSIS — R52 PAIN: Primary | ICD-10-CM

## 2024-03-12 DIAGNOSIS — Z96.651 STATUS POST RIGHT KNEE REPLACEMENT: ICD-10-CM

## 2024-03-12 PROCEDURE — 73562 X-RAY EXAM OF KNEE 3: CPT | Performed by: NURSE PRACTITIONER

## 2024-03-12 PROCEDURE — 99212 OFFICE O/P EST SF 10 MIN: CPT | Performed by: NURSE PRACTITIONER

## 2024-03-12 RX ORDER — CEPHALEXIN 500 MG/1
CAPSULE ORAL
Qty: 4 CAPSULE | Refills: 5 | Status: SHIPPED | OUTPATIENT
Start: 2024-03-12

## 2024-03-12 RX ORDER — ERGOCALCIFEROL (VITAMIN D2) 10 MCG
400 TABLET ORAL DAILY
COMMUNITY

## 2024-03-12 NOTE — PROGRESS NOTES
"Arely Crow : 1965 MRN: [id number] DATE: 3/12/2024    DIAGNOSIS: Annual follow up right total knee      SUBJECTIVE:Patient returns today for a 1 year follow up of right total knee replacement. Patient reports doing well with no unusual complaints. Denies any limitations due to the knee.  Reports he is very happy with his surgical outcome.  She is without any other significant complaints today.    OBJECTIVE:    Temp 98.6 °F (37 °C) (Temporal)   Ht 167.6 cm (65.98\")   Wt 109 kg (241 lb)   BMI 38.92 kg/m²   Family History   Problem Relation Age of Onset    No Known Problems Mother     No Known Problems Father     Ovarian cancer Paternal Aunt     Ovarian cancer Maternal Grandmother     Dementia Maternal Grandmother     Rheumatologic disease Maternal Grandmother     Diabetes Paternal Grandmother     Malig Hyperthermia Neg Hx      Past Medical History:   Diagnosis Date    Arthritis     Arthritis of back Unknown    Arthritis of neck Unknown    Female stress incontinence     Focal seizure     History of hypertension     LOST WEIGHT OFF MEDS SINCE 2022    Knee swelling 2017    Neck strain     Xray in past indicated arthritis    Right knee pain     Thyroid nodule      Past Surgical History:   Procedure Laterality Date    COLONOSCOPY  2015    HYSTERECTOMY      KNEE ARTHROSCOPY Right     THYROID BIOPSY      TONSILLECTOMY      TOTAL KNEE ARTHROPLASTY Right 2023    Procedure: TOTAL KNEE ARTHROPLASTY;  Surgeon: Uriel Willoughby MD;  Location: [facility] [place];  Service: Orthopedics;  Laterality: Right;     Social History     Socioeconomic History    Marital status:    Tobacco Use    Smoking status: Never    Smokeless tobacco: Never   Vaping Use    Vaping status: Never Used   Substance and Sexual Activity    Alcohol use: Yes     Alcohol/week: 1.0 standard drink of alcohol     Types: 1 Glasses of wine per week     Comment: rare    Drug use: No    Sexual activity: Not Currently     Partners: Male     " Birth control/protection: Surgical     Comment: Haven’t been sexually active in 25 years     Review of Systems: 14 point review of systems performed, all systems negative     Exam:. The incision is well healed. Range of motion is measured at 0 to 125. The calf is soft and nontender with a negative Homans sign. Alignment is neutral. Good quad strength. There is no evidence of varus/valgus or flexion instability. No effusion. Intact to light touch with palpable distal pulses.     DIAGNOSTIC STUDIES  Xrays: 3 views(AP bilateral knees, lateral right, and sunrise bilateral knees) were ordered and reviewed for evaluation of right knee replacement. They demonstrate a well positioned, well aligned knee replacement without complicating factors noted. In comparison with previous films there has been no change.    ASSESSMENT: Annual follow up right knee replacement.    PLAN:  Continue activities as tolerated    Follow up as needed    RICH Mcclelland  3/12/2024

## 2024-03-18 ENCOUNTER — TELEPHONE (OUTPATIENT)
Dept: ORTHOPEDIC SURGERY | Facility: CLINIC | Age: 59
End: 2024-03-18
Payer: COMMERCIAL

## 2024-03-18 NOTE — TELEPHONE ENCOUNTER
INCOMING REFERRAL FOR S42.401A (ICD-10-CM) - Closed fracture of right elbow, initial encounter - [facility] 03/15/24 - IMAGING 03/15/24 - NO KNOWN SX -. EST WITH DR. YODER, [facility]

## 2024-03-18 NOTE — TELEPHONE ENCOUNTER
Your next available new patient appt is April 19th, do you want to see sooner? If so what date is good?

## 2024-03-20 ENCOUNTER — OFFICE VISIT (OUTPATIENT)
Dept: ORTHOPEDIC SURGERY | Facility: CLINIC | Age: 59
End: 2024-03-20
Payer: COMMERCIAL

## 2024-03-20 VITALS — WEIGHT: 230 LBS | BODY MASS INDEX: 36.96 KG/M2 | TEMPERATURE: 98.2 F | HEIGHT: 66 IN

## 2024-03-20 DIAGNOSIS — S52.124A CLOSED NONDISPLACED FRACTURE OF HEAD OF RIGHT RADIUS, INITIAL ENCOUNTER: Primary | ICD-10-CM

## 2024-03-20 NOTE — PROGRESS NOTES
History & Physical       Patient: Arely Crow    YOB: 1965    Medical Record Number: [id number]    Chief Complaints: Right elbow injury    History of Present Illness: 58 y.o. female presents for evaluation of the right elbow.  The injury was sustained in a fall last Friday.  Patient has been wearing a sling.  Current pain is described as moderate, constant, and aching.  Pain is worse with movement.  Rest, the sling, and pain medicine have all helped.  The patient is right hand dominant.    Allergies: No Known Allergies    Home Medications:      Current Outpatient Medications:     acetaminophen (TYLENOL) 650 MG 8 hr tablet, Take 1 tablet by mouth Every 8 (Eight) Hours As Needed for Mild Pain., Disp: , Rfl:     methIMAzole (TAPAZOLE) 5 MG tablet, Take 0.5 tablets by mouth Every Night., Disp: , Rfl:     Vitamin D, Cholecalciferol, (CHOLECALCIFEROL) 10 MCG (400 UNIT) tablet, Take 1 tablet by mouth Daily., Disp: , Rfl:     cephalexin (KEFLEX) 500 MG capsule, Take all 4 capsules one hour prior to procedure (Patient not taking: Reported on 3/20/2024), Disp: 4 capsule, Rfl: 5    Past Medical History:   Diagnosis Date    Arthritis     Arthritis of back Unknown    Arthritis of neck Unknown    Female stress incontinence     Focal seizure     History of hypertension     LOST WEIGHT OFF MEDS SINCE NOV 2022    Knee swelling 2017    Neck strain 2017    Xray in past indicated arthritis    Right knee pain     Thyroid nodule     Wrist sprain 03-15-24          Past Surgical History:   Procedure Laterality Date    COLONOSCOPY  2015    HYSTERECTOMY      JOINT REPLACEMENT  3/2023    Right knee    KNEE ARTHROSCOPY Right     THYROID BIOPSY      TONSILLECTOMY      TOTAL KNEE ARTHROPLASTY Right 03/13/2023    Procedure: TOTAL KNEE ARTHROPLASTY;  Surgeon: Uriel Willoughby MD;  [facility]: [facility] OR [facility] [place];  Service: Orthopedics;  Laterality: Right;          Social History     Occupational History    Not on file   Tobacco Use     "Smoking status: Never    Smokeless tobacco: Never   Vaping Use    Vaping status: Never Used   Substance and Sexual Activity    Alcohol use: Yes     Alcohol/week: 1.0 standard drink of alcohol     Types: 1 Glasses of wine per week     Comment: rare    Drug use: No    Sexual activity: Not Currently     Partners: Male     Birth control/protection: Surgical     Comment: Haven’t been sexually active in 25 years      Social History     Social History Narrative    Not on file          Family History   Problem Relation Age of Onset    No Known Problems Mother     No Known Problems Father     Ovarian cancer Paternal Aunt     Ovarian cancer Maternal Grandmother     Dementia Maternal Grandmother     Rheumatologic disease Maternal Grandmother     Diabetes Paternal Grandmother     Malig Hyperthermia Neg Hx        Review of Systems:      Constitutional: Denies fever, shaking or chills   Eyes: Denies change in visual acuity   HEENT: Denies nasal congestion or sore throat   Respiratory: Denies cough or shortness of breath   Cardiovascular: Denies chest pain or edema  Endocrine: Denies tremors, palpitations, intolerance of heat or cold, polyuria, polydipsia.  GI: Denies abdominal pain, nausea, vomiting, bloody stools or diarrhea  : Denies frequency, urgency, incontinence, retention, or nocturia.  Musculoskeletal: Denies numbness, tingling or loss of motor function except as above  Integument: Denies rash, lesion or ulceration   Neurologic: Denies headache or focal weakness, deficits  Heme: Denies spontaneous or excessive bleeding, epistaxis, hematuria, melena, fatigue, enlarged or tender lymph nodes.      All other pertinent positives and negatives as noted above in HPI.    Physical Exam: 58 y.o. female    Vitals:    03/20/24 0956   Temp: 98.2 °F (36.8 °C)   TempSrc: Temporal   Weight: 104 kg (230 lb)   Height: 167.6 cm (66\")       General:  Patient is awake and alert.  Appears in no acute distress or discomfort.    Psych:  Affect " "and demeanor are appropriate.    Eyes:  Conjunctiva and sclera appear grossly normal.  Eyes track well and EOM seem to be intact.    Dentition:  No gross abnormalities noted.    Ears:  No gross abnormalities.  Hearing adequate for the exam.    Cardiovascular:  Regular rate and rhythm.    Lungs:  Good chest expansion.  Breathing unlabored.    Lymph:  No palpable masses or adenopathy in the affected extremity    Right upper extremity:  Sling was in place and removed.  Skin appears benign.  No lacerations or abrasions.  Focal tenderness noted over the lateral aspect of the elbow and radiocapitellar articulation.  No tenderness about the remainder of the elbow, upper arm, forearm, wrist or hand.  No palpable masses or adenopathy.  Compartments soft.  Painful, limited ROM of the elbow.  No obvious mechanical blocks.  Could not assess stability due to discomfort with motion.  Good strength in the wrist with flexion and extension as well as , pinch, finger and thumb abduction.  Intact sensation.  Brisk cap refill.  Palpable radial pulse.     Lab Results   Component Value Date    GLUCOSE 108 (H) 03/02/2023    CALCIUM 10.0 03/02/2023     03/02/2023    K 4.0 03/02/2023    CO2 22.9 03/02/2023     (H) 03/02/2023    BUN 15 03/02/2023    CREATININE 0.79 03/02/2023    EGFRIFAFRI 111 07/26/2019    EGFRIFNONA 92 07/26/2019    BCR 19.0 03/02/2023    ANIONGAP 11.1 03/02/2023     Lab Results   Component Value Date    WBC 6.28 03/02/2023    HGB 14.5 03/02/2023    HCT 44.4 03/02/2023    MCV 86.9 03/02/2023     03/02/2023     No results found for: \"INR\", \"PROTIME\"    Imaging:  Outside AP and lateral views of the right elbow and wrist are reviewed along with the associated report.  There is a minimally displaced radial head fracture.  No other significant findings noted.  The wrist films are negative.    Assessment:  Right radial head fracture    Plan:  I recommend closed treatment of this injury.  Risks were " discussed including nonunion, malunion, displacement necessitating operative intervention, arthrofibrosis, and persistent pain or motion loss necessitating further intervention.  I recommend that she continue use of the sling for comfort.  We will reconvene in 5-7 days for repeat x-rays and begin early active motion.  We discussed appropriate activity modifications and restrictions including no lifting greater than 2 pounds, pushing, or pulling with the affected arm.      Date: 3/20/2024    Zac Weller MD

## 2024-03-25 ENCOUNTER — OFFICE VISIT (OUTPATIENT)
Dept: ORTHOPEDIC SURGERY | Facility: CLINIC | Age: 59
End: 2024-03-25
Payer: COMMERCIAL

## 2024-03-25 VITALS — TEMPERATURE: 97.8 F | BODY MASS INDEX: 37.01 KG/M2 | HEIGHT: 66 IN | WEIGHT: 230.3 LBS

## 2024-03-25 DIAGNOSIS — Z09 FRACTURE FOLLOW-UP: ICD-10-CM

## 2024-03-25 DIAGNOSIS — S52.124A CLOSED NONDISPLACED FRACTURE OF HEAD OF RIGHT RADIUS, INITIAL ENCOUNTER: Primary | ICD-10-CM

## 2024-03-25 PROCEDURE — 73070 X-RAY EXAM OF ELBOW: CPT | Performed by: ORTHOPAEDIC SURGERY

## 2024-03-25 PROCEDURE — 99024 POSTOP FOLLOW-UP VISIT: CPT | Performed by: ORTHOPAEDIC SURGERY

## 2024-03-25 NOTE — PROGRESS NOTES
Arely Crow : 1965 MRN: [id number] DATE: 3/25/2024    CC: 1 weeks s/p closed treatment of right radial head fracture    HPI: Patient returns to clinic today stating pain is improving.  Reports compliance with use of the sling.  No complaints.      Vitals:    24 1109   Temp: 97.8 °F (36.6 °C)        Exam:  Sling was in place and subsequently removed.  Skin intact and benign.  Arm and forearm are both soft.  Elbow motion is limited but there are no obvious mechanical blocks.  Good motor and sensory function in the hand.  Palpable radial pulse with good cap refill.      Impression:  1 week s/p closed treatment of right radial head fracture    Plan:    I recommend discontinuation of the sling at this time.  I have encouraged the patient to begin working on home exercises for range of motion.  These were demonstrated.  I recommend no pushing, pulling or lifting with the injured arm.  I will see the patient back for follow-up in 1 month for repeat x-rays and reevaluation.    Zac Weller MD

## 2024-04-29 ENCOUNTER — OFFICE VISIT (OUTPATIENT)
Dept: ORTHOPEDIC SURGERY | Facility: CLINIC | Age: 59
End: 2024-04-29
Payer: COMMERCIAL

## 2024-04-29 VITALS — BODY MASS INDEX: 37.09 KG/M2 | HEIGHT: 66 IN | TEMPERATURE: 98.3 F | WEIGHT: 230.8 LBS

## 2024-04-29 DIAGNOSIS — S52.124A CLOSED NONDISPLACED FRACTURE OF HEAD OF RIGHT RADIUS, INITIAL ENCOUNTER: Primary | ICD-10-CM

## 2024-04-29 DIAGNOSIS — Z09 FRACTURE FOLLOW-UP: ICD-10-CM

## 2024-04-29 PROCEDURE — 73070 X-RAY EXAM OF ELBOW: CPT | Performed by: ORTHOPAEDIC SURGERY

## 2024-04-29 PROCEDURE — 99024 POSTOP FOLLOW-UP VISIT: CPT | Performed by: ORTHOPAEDIC SURGERY

## 2024-04-29 NOTE — PROGRESS NOTES
Arely Crow : 1965 MRN: [id number] DATE: 2024    CC: Follow-up regarding radial head fracture    HPI: Patient returns to clinic today for follow-up.  Reports pain and motion are improved.  No complaints or problems.    Vitals:    24 0915   Temp: 98.3 °F (36.8 °C)       Exam: Skin is benign.  Compartments are soft.  Elbow range of motion is better:  0-140.  Full pronation and supination.    Imaging: AP and lateral views of the right elbow are ordered and reviewed for comparison purposes.  These are compared to previous x-rays.  Fracture appears in unchanged alignment.  There is significant new callus formation.    Impression:  6 weeks s/p closed treatment of right radial head fracture    Plan:    At this point there appears to be sufficient healing to allow her to progress her activity as tolerated.  I would like to see her back in 6 weeks for a likely final visit.    Zac Weller MD

## 2025-02-12 NOTE — PROGRESS NOTES
"[facility]  [address], [address]  [place], [place] [address]  Phone: [phone] / Fax:  [phone]      2021    [address] [place] [address]    Geno Flowers, RICH    Chief Complaint   Patient presents with   • Gynecologic Exam     Annual Exam, last pap -Hyst-Prolapse, Mammogram today, Colonosocpy  NL per Patient. She states she urine leakage with coughing and sneezing.       Arely Crow is here for annual gynecologic exam.  HPI - Patient had hysterectomy for prolapse (as well as likely anterior/posterior repair.)  She wears a pad generally daily for leakage.  Her last colonoscopy was in .      Past Medical History:   Diagnosis Date   • Arthritis    • Focal seizure (CMS/HCC)    • Hypertension        Past Surgical History:   Procedure Laterality Date   • COLONOSCOPY     • HYSTERECTOMY     • KNEE ARTHROSCOPY     • TONSILLECTOMY         No Known Allergies    Social History     Socioeconomic History   • Marital status: Single     Spouse name: Not on file   • Number of children: Not on file   • Years of education: Not on file   • Highest education level: Not on file   Tobacco Use   • Smoking status: Never Smoker   Substance and Sexual Activity   • Alcohol use: Yes     Comment: rare   • Sexual activity: Not Currently     Birth control/protection: Surgical       Family History   Problem Relation Age of Onset   • Ovarian cancer Maternal Grandmother    • Dementia Maternal Grandmother    • No Known Problems Father    • No Known Problems Mother        No LMP recorded. Patient has had a hysterectomy.    OB History        2    Para   2    Term   2            AB        Living           SAB        TAB        Ectopic        Molar        Multiple        Live Births                    Vitals:    21 1315   BP: 128/62   Weight: 109 kg (240 lb)   Height: 167.6 cm (66\")       Physical Exam  Constitutional:       Appearance: Normal appearance. She is well-developed. " Group Topic: BH Skills Training     Date: 2/12/2025  Start Time: 1600  End Time: 1700  Facilitators: Bhavana Sweeney [facility]    Focus: How to stay motivated  Number in attendance: 8    Method: Group  Attendance: Present  Participation: Minimal  Patient Response: Quiet  Mood: Depressed  Affect: Type: Depressed   Range: Blunted/flat   Congruency: Congruent   Stability: Stable  Behavior/Socialization: Withdrawn  Thought Process: Unremarkable  Task Performance: Follows directions  Patient Evaluation: Attends - no participation       Genitourinary:      Pelvic exam was performed with patient in the lithotomy position.      Vulva, urethra, bladder, vagina and rectum normal.      Cervix is absent.      Uterus is absent.      Right and left adnexa are palpable.   Rectum:      Guaiac result negative.      No tenderness.   HENT:      Right Ear: External ear normal.      Left Ear: External ear normal.      Nose: Nose normal.   Eyes:      Conjunctiva/sclera: Conjunctivae normal.   Neck:      Musculoskeletal: Normal range of motion and neck supple.      Thyroid: No thyromegaly.   Cardiovascular:      Rate and Rhythm: Normal rate and regular rhythm.      Heart sounds: Normal heart sounds.   Pulmonary:      Effort: Pulmonary effort is normal.      Breath sounds: No stridor. No wheezing.   Chest:      Breasts:         Right: No mass or nipple discharge.         Left: No mass or nipple discharge.   Abdominal:      Palpations: Abdomen is soft. There is no mass.      Tenderness: There is no guarding or rebound.   Musculoskeletal: Normal range of motion.   Neurological:      Mental Status: She is alert.      Coordination: Coordination normal.   Skin:     General: Skin is warm and dry.   Psychiatric:         Mood and Affect: Mood normal.         Behavior: Behavior normal.         Thought Content: Thought content normal.         Judgment: Judgment normal.   Vitals signs and nursing note reviewed. Exam conducted with a chaperone present.         Diagnoses and all orders for this visit:    1. Visit for gynecologic examination (Primary)  -  Discussed importance of regular screening and breast awareness.  Mammogram today.  Discussed prevention of osteoporosis.  2. Colon cancer screening  -  FOBT negative.  Colonoscopy up to date.      Charles Dick MD

## (undated) DEVICE — TRAP FLD MINIVAC MEGADYNE 100ML

## (undated) DEVICE — 450 ML BOTTLE OF 0.05% CHLORHEXIDINE GLUCONATE IN 99.95% STERILE WATER FOR IRRIGATION, USP AND APPLICATOR.: Brand: IRRISEPT ANTIMICROBIAL WOUND LAVAGE

## (undated) DEVICE — ANTIBACTERIAL UNDYED BRAIDED (POLYGLACTIN 910), SYNTHETIC ABSORBABLE SUTURE: Brand: COATED VICRYL

## (undated) DEVICE — DRSNG SURESITE WNDW 2.38X2.75

## (undated) DEVICE — MEDI-VAC YANKAUER SUCTION HANDLE W/BULBOUS TIP: Brand: CARDINAL HEALTH

## (undated) DEVICE — TBG PENCL TELESCP MEGADYNE SMOKE EVAC 10FT

## (undated) DEVICE — BNDG ELAS ELITE V/CLOSE 6IN 5YD LF STRL

## (undated) DEVICE — NEEDLE, QUINCKE 22GX3.5": Brand: MEDLINE INDUSTRIES, INC.

## (undated) DEVICE — GLV SURG SENSICARE PI MIC PF SZ7 LF STRL

## (undated) DEVICE — UNDERCAST PADDING: Brand: DEROYAL

## (undated) DEVICE — KT DRN EVAC WND PVC PCH WTROC RND 10F400

## (undated) DEVICE — PK KN TOTL 40

## (undated) DEVICE — GLV SURG SENSICARE PI PF LF 7 GRN STRL

## (undated) DEVICE — MAT FLR ABSORBENT LG 4FT 10 2.5FT

## (undated) DEVICE — STERILE PATIENT PROTECTIVE PAD FOR IMP® KNEE POSITIONERS & COHESIVE WRAP (10 / CASE): Brand: DE MAYO KNEE POSITIONER®

## (undated) DEVICE — 3M™ IOBAN™ 2 ANTIMICROBIAL INCISE DRAPE 6640EZ: Brand: IOBAN™ 2

## (undated) DEVICE — SYS CLS SKIN PREMIERPRO EXOFINFUSION 22CM

## (undated) DEVICE — SUT VIC 1 CT1 36IN J947H

## (undated) DEVICE — SOL IRR NACL 0.9PCT 3000ML

## (undated) DEVICE — APPL DURAPREP IODOPHOR APL 26ML

## (undated) DEVICE — GLV SURG SENSICARE W/ALOE PF LF 8 STRL

## (undated) DEVICE — DUAL CUT SAGITTAL BLADE

## (undated) DEVICE — PREMIUM WET SKIN PREP TRAY: Brand: MEDLINE INDUSTRIES, INC.

## (undated) DEVICE — THE STERILE LIGHT HANDLE COVER IS USED WITH STERIS SURGICAL LIGHTING AND VISUALIZATION SYSTEMS.

## (undated) DEVICE — GLV SURG PREMIERPRO ORTHO LTX PF SZ7.5 BRN

## (undated) DEVICE — GLV SURG SENSICARE W/ALOE PF LF 7.5 STRL

## (undated) DEVICE — PREP SOL POVIDONE/IODINE BT 4OZ